# Patient Record
Sex: MALE | Race: WHITE | Employment: OTHER | URBAN - METROPOLITAN AREA
[De-identification: names, ages, dates, MRNs, and addresses within clinical notes are randomized per-mention and may not be internally consistent; named-entity substitution may affect disease eponyms.]

---

## 2017-02-25 ENCOUNTER — HOSPITAL ENCOUNTER (INPATIENT)
Age: 57
LOS: 1 days | Discharge: HOME | DRG: 045 | End: 2017-02-26
Attending: EMERGENCY MEDICINE | Admitting: INTERNAL MEDICINE
Payer: MEDICARE

## 2017-02-25 ENCOUNTER — APPOINTMENT (OUTPATIENT)
Dept: CT IMAGING | Age: 57
DRG: 045 | End: 2017-02-25
Payer: MEDICARE

## 2017-02-25 DIAGNOSIS — R20.0 LEFT ARM NUMBNESS: ICD-10-CM

## 2017-02-25 DIAGNOSIS — I63.9 CEREBROVASCULAR ACCIDENT (CVA), UNSPECIFIED MECHANISM (HCC): Primary | ICD-10-CM

## 2017-02-25 LAB — GLUCOSE BLD-MCNC: 85 MG/DL (ref 75–110)

## 2017-02-25 PROCEDURE — 2060000000 HC ICU INTERMEDIATE R&B

## 2017-02-25 PROCEDURE — 70496 CT ANGIOGRAPHY HEAD: CPT

## 2017-02-25 PROCEDURE — 84484 ASSAY OF TROPONIN QUANT: CPT

## 2017-02-25 PROCEDURE — 82947 ASSAY GLUCOSE BLOOD QUANT: CPT

## 2017-02-25 PROCEDURE — G0378 HOSPITAL OBSERVATION PER HR: HCPCS

## 2017-02-25 PROCEDURE — 70498 CT ANGIOGRAPHY NECK: CPT

## 2017-02-25 PROCEDURE — 81001 URINALYSIS AUTO W/SCOPE: CPT

## 2017-02-25 PROCEDURE — 2580000003 HC RX 258: Performed by: STUDENT IN AN ORGANIZED HEALTH CARE EDUCATION/TRAINING PROGRAM

## 2017-02-25 PROCEDURE — 6360000004 HC RX CONTRAST MEDICATION: Performed by: EMERGENCY MEDICINE

## 2017-02-25 PROCEDURE — 99285 EMERGENCY DEPT VISIT HI MDM: CPT

## 2017-02-25 PROCEDURE — 99254 IP/OBS CNSLTJ NEW/EST MOD 60: CPT | Performed by: PSYCHIATRY & NEUROLOGY

## 2017-02-25 RX ORDER — FOLIC ACID 1 MG/1
1 TABLET ORAL DAILY
Status: DISCONTINUED | OUTPATIENT
Start: 2017-02-26 | End: 2017-02-26 | Stop reason: HOSPADM

## 2017-02-25 RX ORDER — CLOPIDOGREL BISULFATE 75 MG/1
300 TABLET ORAL ONCE
Status: COMPLETED | OUTPATIENT
Start: 2017-02-25 | End: 2017-02-26

## 2017-02-25 RX ORDER — SODIUM CHLORIDE 0.9 % (FLUSH) 0.9 %
10 SYRINGE (ML) INJECTION PRN
Status: DISCONTINUED | OUTPATIENT
Start: 2017-02-25 | End: 2017-02-26 | Stop reason: HOSPADM

## 2017-02-25 RX ORDER — ALBUTEROL SULFATE 2.5 MG/3ML
2.5 SOLUTION RESPIRATORY (INHALATION) EVERY 6 HOURS PRN
COMMUNITY

## 2017-02-25 RX ORDER — ONDANSETRON 2 MG/ML
4 INJECTION INTRAMUSCULAR; INTRAVENOUS EVERY 6 HOURS PRN
Status: DISCONTINUED | OUTPATIENT
Start: 2017-02-25 | End: 2017-02-25 | Stop reason: SDUPTHER

## 2017-02-25 RX ORDER — SODIUM CHLORIDE 0.9 % (FLUSH) 0.9 %
10 SYRINGE (ML) INJECTION EVERY 12 HOURS SCHEDULED
Status: DISCONTINUED | OUTPATIENT
Start: 2017-02-25 | End: 2017-02-26

## 2017-02-25 RX ORDER — OXYCODONE HYDROCHLORIDE AND ACETAMINOPHEN 5; 325 MG/1; MG/1
1 TABLET ORAL EVERY 4 HOURS PRN
Status: ON HOLD | COMMUNITY
End: 2017-02-26 | Stop reason: ALTCHOICE

## 2017-02-25 RX ORDER — SODIUM CHLORIDE 0.9 % (FLUSH) 0.9 %
10 SYRINGE (ML) INJECTION PRN
Status: DISCONTINUED | OUTPATIENT
Start: 2017-02-25 | End: 2017-02-26

## 2017-02-25 RX ORDER — CLOPIDOGREL BISULFATE 75 MG/1
75 TABLET ORAL DAILY
Status: DISCONTINUED | OUTPATIENT
Start: 2017-02-26 | End: 2017-02-26 | Stop reason: HOSPADM

## 2017-02-25 RX ORDER — ONDANSETRON 2 MG/ML
4 INJECTION INTRAMUSCULAR; INTRAVENOUS EVERY 6 HOURS PRN
Status: DISCONTINUED | OUTPATIENT
Start: 2017-02-25 | End: 2017-02-26 | Stop reason: HOSPADM

## 2017-02-25 RX ORDER — ASPIRIN 81 MG/1
81 TABLET ORAL DAILY
Status: DISCONTINUED | OUTPATIENT
Start: 2017-02-26 | End: 2017-02-25

## 2017-02-25 RX ORDER — SIMVASTATIN 40 MG
40 TABLET ORAL NIGHTLY
Status: DISCONTINUED | OUTPATIENT
Start: 2017-02-25 | End: 2017-02-25

## 2017-02-25 RX ORDER — PANTOPRAZOLE SODIUM 20 MG/1
20 TABLET, DELAYED RELEASE ORAL DAILY
Status: ON HOLD | COMMUNITY
End: 2017-02-26 | Stop reason: HOSPADM

## 2017-02-25 RX ORDER — ACETAMINOPHEN 325 MG/1
650 TABLET ORAL EVERY 4 HOURS PRN
Status: DISCONTINUED | OUTPATIENT
Start: 2017-02-25 | End: 2017-02-26 | Stop reason: HOSPADM

## 2017-02-25 RX ORDER — SODIUM CHLORIDE 9 MG/ML
INJECTION, SOLUTION INTRAVENOUS CONTINUOUS
Status: DISCONTINUED | OUTPATIENT
Start: 2017-02-25 | End: 2017-02-26 | Stop reason: HOSPADM

## 2017-02-25 RX ORDER — ASPIRIN 81 MG/1
81 TABLET ORAL DAILY
Status: DISCONTINUED | OUTPATIENT
Start: 2017-02-26 | End: 2017-02-26 | Stop reason: HOSPADM

## 2017-02-25 RX ORDER — ATORVASTATIN CALCIUM 40 MG/1
40 TABLET, FILM COATED ORAL NIGHTLY
Status: DISCONTINUED | OUTPATIENT
Start: 2017-02-25 | End: 2017-02-26 | Stop reason: HOSPADM

## 2017-02-25 RX ORDER — PANTOPRAZOLE SODIUM 20 MG/1
20 TABLET, DELAYED RELEASE ORAL DAILY
Status: DISCONTINUED | OUTPATIENT
Start: 2017-02-26 | End: 2017-02-26 | Stop reason: HOSPADM

## 2017-02-25 RX ORDER — ALBUTEROL SULFATE 2.5 MG/3ML
2.5 SOLUTION RESPIRATORY (INHALATION) EVERY 6 HOURS PRN
Status: DISCONTINUED | OUTPATIENT
Start: 2017-02-25 | End: 2017-02-26 | Stop reason: HOSPADM

## 2017-02-25 RX ORDER — FOLIC ACID 1 MG/1
1 TABLET ORAL DAILY
COMMUNITY
End: 2018-02-06 | Stop reason: SDUPTHER

## 2017-02-25 RX ORDER — CLOPIDOGREL 300 MG/1
300 TABLET, FILM COATED ORAL ONCE
COMMUNITY
End: 2018-02-06 | Stop reason: ALTCHOICE

## 2017-02-25 RX ORDER — MELOXICAM 15 MG/1
15 TABLET ORAL DAILY
Status: ON HOLD | COMMUNITY
End: 2017-02-26 | Stop reason: HOSPADM

## 2017-02-25 RX ORDER — CARVEDILOL 6.25 MG/1
6.25 TABLET ORAL 2 TIMES DAILY WITH MEALS
COMMUNITY
End: 2018-02-06 | Stop reason: ALTCHOICE

## 2017-02-25 RX ORDER — ACETAMINOPHEN 325 MG/1
650 TABLET ORAL EVERY 4 HOURS PRN
Status: DISCONTINUED | OUTPATIENT
Start: 2017-02-25 | End: 2017-02-25 | Stop reason: SDUPTHER

## 2017-02-25 RX ADMIN — SODIUM CHLORIDE: 9 INJECTION, SOLUTION INTRAVENOUS at 23:53

## 2017-02-25 RX ADMIN — IOHEXOL 90 ML: 350 INJECTION, SOLUTION INTRAVENOUS at 22:44

## 2017-02-25 ASSESSMENT — PAIN DESCRIPTION - LOCATION: LOCATION: ARM

## 2017-02-25 ASSESSMENT — ENCOUNTER SYMPTOMS
COLOR CHANGE: 0
DIARRHEA: 0
COUGH: 0
TROUBLE SWALLOWING: 0
SHORTNESS OF BREATH: 0
NAUSEA: 0
ABDOMINAL PAIN: 0
SORE THROAT: 0
VOMITING: 0

## 2017-02-25 ASSESSMENT — PAIN SCALES - GENERAL
PAINLEVEL_OUTOF10: 0
PAINLEVEL_OUTOF10: 7

## 2017-02-25 ASSESSMENT — PAIN DESCRIPTION - ORIENTATION: ORIENTATION: LEFT

## 2017-02-25 ASSESSMENT — PAIN DESCRIPTION - DESCRIPTORS: DESCRIPTORS: NUMBNESS

## 2017-02-26 ENCOUNTER — APPOINTMENT (OUTPATIENT)
Dept: MRI IMAGING | Age: 57
DRG: 045 | End: 2017-02-26
Payer: MEDICARE

## 2017-02-26 VITALS
TEMPERATURE: 97.3 F | HEIGHT: 72 IN | OXYGEN SATURATION: 98 % | HEART RATE: 67 BPM | WEIGHT: 165 LBS | RESPIRATION RATE: 18 BRPM | BODY MASS INDEX: 22.35 KG/M2 | SYSTOLIC BLOOD PRESSURE: 132 MMHG | DIASTOLIC BLOOD PRESSURE: 76 MMHG

## 2017-02-26 PROBLEM — J44.9 COPD (CHRONIC OBSTRUCTIVE PULMONARY DISEASE) (HCC): Status: ACTIVE | Noted: 2017-02-26

## 2017-02-26 PROBLEM — I25.810 CAD (CORONARY ARTERY DISEASE) OF ARTERY BYPASS GRAFT: Status: ACTIVE | Noted: 2017-02-26

## 2017-02-26 PROBLEM — I63.9 CVA (CEREBRAL VASCULAR ACCIDENT) (HCC): Status: ACTIVE | Noted: 2017-02-26

## 2017-02-26 PROBLEM — E78.5 HYPERLIPIDEMIA: Status: ACTIVE | Noted: 2017-02-26

## 2017-02-26 LAB
-: ABNORMAL
ABSOLUTE EOS #: 0.1 K/UL (ref 0–0.4)
ABSOLUTE LYMPH #: 1.2 K/UL (ref 1–4.8)
ABSOLUTE MONO #: 0.8 K/UL (ref 0.1–1.2)
AMORPHOUS: ABNORMAL
ANION GAP SERPL CALCULATED.3IONS-SCNC: 13 MMOL/L (ref 9–17)
BACTERIA: ABNORMAL
BASOPHILS # BLD: 1 % (ref 0–2)
BASOPHILS ABSOLUTE: 0 K/UL (ref 0–0.2)
BILIRUBIN URINE: NEGATIVE
BUN BLDV-MCNC: 17 MG/DL (ref 6–20)
BUN/CREAT BLD: NORMAL (ref 9–20)
CALCIUM SERPL-MCNC: 9.1 MG/DL (ref 8.6–10.4)
CASTS UA: ABNORMAL /LPF (ref 0–8)
CHLORIDE BLD-SCNC: 102 MMOL/L (ref 98–107)
CHOLESTEROL/HDL RATIO: 3.9
CHOLESTEROL: 142 MG/DL
CO2: 23 MMOL/L (ref 20–31)
COLOR: YELLOW
CREAT SERPL-MCNC: 1.07 MG/DL (ref 0.7–1.2)
CRYSTALS, UA: ABNORMAL /HPF
DIFFERENTIAL TYPE: ABNORMAL
EOSINOPHILS RELATIVE PERCENT: 2 % (ref 1–4)
EPITHELIAL CELLS UA: ABNORMAL /HPF (ref 0–5)
ESTIMATED AVERAGE GLUCOSE: 111 MG/DL
FOLATE: >20 NG/ML
GFR AFRICAN AMERICAN: >60 ML/MIN
GFR NON-AFRICAN AMERICAN: >60 ML/MIN
GFR SERPL CREATININE-BSD FRML MDRD: NORMAL ML/MIN/{1.73_M2}
GFR SERPL CREATININE-BSD FRML MDRD: NORMAL ML/MIN/{1.73_M2}
GLUCOSE BLD-MCNC: 95 MG/DL (ref 70–99)
GLUCOSE URINE: NEGATIVE
HBA1C MFR BLD: 5.5 % (ref 4–6)
HCT VFR BLD CALC: 35.5 % (ref 41–53)
HDLC SERPL-MCNC: 36 MG/DL
HEMOGLOBIN: 11.9 G/DL (ref 13.5–17.5)
KETONES, URINE: NEGATIVE
LDL CHOLESTEROL: 81 MG/DL (ref 0–130)
LEUKOCYTE ESTERASE, URINE: NEGATIVE
LYMPHOCYTES # BLD: 18 % (ref 24–44)
MCH RBC QN AUTO: 29.2 PG (ref 26–34)
MCHC RBC AUTO-ENTMCNC: 33.7 G/DL (ref 31–37)
MCV RBC AUTO: 86.7 FL (ref 80–100)
MONOCYTES # BLD: 11 % (ref 2–11)
MUCUS: ABNORMAL
NITRITE, URINE: NEGATIVE
OTHER OBSERVATIONS UA: ABNORMAL
PDW BLD-RTO: 14.8 % (ref 12.5–15.4)
PH UA: 5 (ref 5–8)
PLATELET # BLD: 216 K/UL (ref 140–450)
PLATELET ESTIMATE: ABNORMAL
PMV BLD AUTO: 7.9 FL (ref 6–12)
POTASSIUM SERPL-SCNC: 4.2 MMOL/L (ref 3.7–5.3)
PROTEIN UA: NEGATIVE
RBC # BLD: 4.09 M/UL (ref 4.5–5.9)
RBC # BLD: ABNORMAL 10*6/UL
RBC UA: ABNORMAL /HPF (ref 0–4)
RENAL EPITHELIAL, UA: ABNORMAL /HPF
SEDIMENTATION RATE, ERYTHROCYTE: 6 MM (ref 0–10)
SEG NEUTROPHILS: 68 % (ref 36–66)
SEGMENTED NEUTROPHILS ABSOLUTE COUNT: 4.6 K/UL (ref 1.8–7.7)
SODIUM BLD-SCNC: 138 MMOL/L (ref 135–144)
SPECIFIC GRAVITY UA: 1.04 (ref 1–1.03)
TRICHOMONAS: ABNORMAL
TRIGL SERPL-MCNC: 124 MG/DL
TROPONIN INTERP: NORMAL
TROPONIN INTERP: NORMAL
TROPONIN T: <0.03 NG/ML
TROPONIN T: <0.03 NG/ML
TSH SERPL DL<=0.05 MIU/L-ACNC: 2.61 MIU/L (ref 0.3–5)
TURBIDITY: CLEAR
URINE HGB: NEGATIVE
UROBILINOGEN, URINE: NORMAL
VITAMIN B-12: 265 PG/ML (ref 211–946)
VLDLC SERPL CALC-MCNC: ABNORMAL MG/DL (ref 1–30)
WBC # BLD: 6.7 K/UL (ref 3.5–11)
WBC # BLD: ABNORMAL 10*3/UL
WBC UA: ABNORMAL /HPF (ref 0–5)
YEAST: ABNORMAL

## 2017-02-26 PROCEDURE — G8987 SELF CARE CURRENT STATUS: HCPCS

## 2017-02-26 PROCEDURE — 83036 HEMOGLOBIN GLYCOSYLATED A1C: CPT

## 2017-02-26 PROCEDURE — 82607 VITAMIN B-12: CPT

## 2017-02-26 PROCEDURE — 84443 ASSAY THYROID STIM HORMONE: CPT

## 2017-02-26 PROCEDURE — 97165 OT EVAL LOW COMPLEX 30 MIN: CPT

## 2017-02-26 PROCEDURE — G8988 SELF CARE GOAL STATUS: HCPCS

## 2017-02-26 PROCEDURE — 82746 ASSAY OF FOLIC ACID SERUM: CPT

## 2017-02-26 PROCEDURE — 85651 RBC SED RATE NONAUTOMATED: CPT

## 2017-02-26 PROCEDURE — 70551 MRI BRAIN STEM W/O DYE: CPT

## 2017-02-26 PROCEDURE — 94762 N-INVAS EAR/PLS OXIMTRY CONT: CPT

## 2017-02-26 PROCEDURE — 36415 COLL VENOUS BLD VENIPUNCTURE: CPT

## 2017-02-26 PROCEDURE — 97161 PT EVAL LOW COMPLEX 20 MIN: CPT

## 2017-02-26 PROCEDURE — 99223 1ST HOSP IP/OBS HIGH 75: CPT | Performed by: INTERNAL MEDICINE

## 2017-02-26 PROCEDURE — 84484 ASSAY OF TROPONIN QUANT: CPT

## 2017-02-26 PROCEDURE — 97535 SELF CARE MNGMENT TRAINING: CPT

## 2017-02-26 PROCEDURE — 85025 COMPLETE CBC W/AUTO DIFF WBC: CPT

## 2017-02-26 PROCEDURE — G0378 HOSPITAL OBSERVATION PER HR: HCPCS

## 2017-02-26 PROCEDURE — 80048 BASIC METABOLIC PNL TOTAL CA: CPT

## 2017-02-26 PROCEDURE — 6360000002 HC RX W HCPCS: Performed by: STUDENT IN AN ORGANIZED HEALTH CARE EDUCATION/TRAINING PROGRAM

## 2017-02-26 PROCEDURE — 6370000000 HC RX 637 (ALT 250 FOR IP): Performed by: EMERGENCY MEDICINE

## 2017-02-26 PROCEDURE — 6370000000 HC RX 637 (ALT 250 FOR IP): Performed by: STUDENT IN AN ORGANIZED HEALTH CARE EDUCATION/TRAINING PROGRAM

## 2017-02-26 PROCEDURE — 80061 LIPID PANEL: CPT

## 2017-02-26 PROCEDURE — 70551 MRI BRAIN STEM W/O DYE: CPT | Performed by: RADIOLOGY

## 2017-02-26 PROCEDURE — 99254 IP/OBS CNSLTJ NEW/EST MOD 60: CPT

## 2017-02-26 RX ORDER — NICOTINE POLACRILEX 4 MG/1
20 GUM, CHEWING ORAL DAILY
COMMUNITY
End: 2018-02-06 | Stop reason: SDUPTHER

## 2017-02-26 RX ORDER — NITROGLYCERIN 0.4 MG/1
0.4 TABLET SUBLINGUAL EVERY 5 MIN PRN
COMMUNITY

## 2017-02-26 RX ORDER — ATORVASTATIN CALCIUM 40 MG/1
40 TABLET, FILM COATED ORAL NIGHTLY
Qty: 30 TABLET | Refills: 3 | Status: SHIPPED | OUTPATIENT
Start: 2017-02-26 | End: 2018-02-06 | Stop reason: SDUPTHER

## 2017-02-26 RX ADMIN — FOLIC ACID 1 MG: 1 TABLET ORAL at 08:45

## 2017-02-26 RX ADMIN — PANTOPRAZOLE SODIUM 20 MG: 20 TABLET, DELAYED RELEASE ORAL at 08:45

## 2017-02-26 RX ADMIN — CLOPIDOGREL 300 MG: 75 TABLET, FILM COATED ORAL at 00:56

## 2017-02-26 RX ADMIN — ASPIRIN 81 MG: 81 TABLET, COATED ORAL at 08:45

## 2017-02-26 RX ADMIN — CLOPIDOGREL 75 MG: 75 TABLET, FILM COATED ORAL at 08:45

## 2017-02-26 RX ADMIN — ENOXAPARIN SODIUM 40 MG: 40 INJECTION SUBCUTANEOUS at 08:45

## 2017-02-26 RX ADMIN — ATORVASTATIN CALCIUM 40 MG: 40 TABLET, FILM COATED ORAL at 00:55

## 2018-02-06 PROBLEM — I10 HYPERTENSION: Status: ACTIVE | Noted: 2018-02-06

## 2018-02-21 ENCOUNTER — HOSPITAL ENCOUNTER (OUTPATIENT)
Dept: PAIN MANAGEMENT | Age: 58
Discharge: HOME | End: 2018-02-21
Payer: MEDICARE

## 2018-02-21 VITALS
BODY MASS INDEX: 27.92 KG/M2 | TEMPERATURE: 98.2 F | DIASTOLIC BLOOD PRESSURE: 75 MMHG | HEIGHT: 70 IN | WEIGHT: 195 LBS | OXYGEN SATURATION: 100 % | RESPIRATION RATE: 18 BRPM | SYSTOLIC BLOOD PRESSURE: 149 MMHG | HEART RATE: 65 BPM

## 2018-02-21 DIAGNOSIS — G89.29 CHRONIC BILATERAL LOW BACK PAIN, WITH SCIATICA PRESENCE UNSPECIFIED: ICD-10-CM

## 2018-02-21 DIAGNOSIS — J44.9 CHRONIC OBSTRUCTIVE PULMONARY DISEASE, UNSPECIFIED COPD TYPE (HCC): ICD-10-CM

## 2018-02-21 DIAGNOSIS — G62.1 ALCOHOL-INDUCED POLYNEUROPATHY (HCC): ICD-10-CM

## 2018-02-21 DIAGNOSIS — I25.810 CORONARY ARTERY DISEASE INVOLVING CORONARY BYPASS GRAFT OF NATIVE HEART WITHOUT ANGINA PECTORIS: ICD-10-CM

## 2018-02-21 DIAGNOSIS — I63.9 CEREBROVASCULAR ACCIDENT (CVA), UNSPECIFIED MECHANISM (HCC): Primary | ICD-10-CM

## 2018-02-21 DIAGNOSIS — I73.9 PERIPHERAL VASCULAR DISEASE (HCC): ICD-10-CM

## 2018-02-21 DIAGNOSIS — M54.5 CHRONIC BILATERAL LOW BACK PAIN, WITH SCIATICA PRESENCE UNSPECIFIED: ICD-10-CM

## 2018-02-21 PROCEDURE — 80307 DRUG TEST PRSMV CHEM ANLYZR: CPT

## 2018-02-21 PROCEDURE — 99205 OFFICE O/P NEW HI 60 MIN: CPT

## 2018-02-21 PROCEDURE — 99244 OFF/OP CNSLTJ NEW/EST MOD 40: CPT | Performed by: PAIN MEDICINE

## 2018-02-21 ASSESSMENT — ENCOUNTER SYMPTOMS
SHORTNESS OF BREATH: 1
HEARTBURN: 1
SORE THROAT: 0
VOMITING: 0
NAUSEA: 0
SINUS PAIN: 0
BLURRED VISION: 0
BACK PAIN: 1
COUGH: 1
EYES NEGATIVE: 1

## 2018-02-21 ASSESSMENT — PAIN SCALES - GENERAL: PAINLEVEL_OUTOF10: 10

## 2018-02-21 ASSESSMENT — PAIN DESCRIPTION - ORIENTATION: ORIENTATION: RIGHT;LEFT

## 2018-02-21 ASSESSMENT — PAIN DESCRIPTION - ONSET: ONSET: ON-GOING

## 2018-02-21 ASSESSMENT — PAIN DESCRIPTION - LOCATION: LOCATION: BACK;LEG;SHOULDER;NECK

## 2018-02-21 ASSESSMENT — PAIN DESCRIPTION - FREQUENCY: FREQUENCY: CONTINUOUS

## 2018-02-21 ASSESSMENT — PAIN DESCRIPTION - PROGRESSION: CLINICAL_PROGRESSION: GRADUALLY WORSENING

## 2018-02-21 ASSESSMENT — PAIN DESCRIPTION - PAIN TYPE: TYPE: CHRONIC PAIN

## 2018-02-21 NOTE — PROGRESS NOTES
[facility] Pain Management  Patient Pain Assessment  RECHECK - No att. providers found    Primary Care Physician: Nicolás Prather MD    Chief complaint:   Chief Complaint   Patient presents with    Back Pain    Neck Pain   . HISTORY OF PRESENT ILLNESS:  Zaida Salgado is 56 y.o. male  Referred to the pain clinic in consultation for back pain and neck pain and leg pain by Dr. Nicolás Prather MD        Pain excruciating -cannt walk o-cramopas at [facility]--burnibng tingly-  consst---10   patient is a 79-year-old male who is referred to the pain clinic today with a chief complaint of pain involving the low back from waist down to the legs. The pain is burning with associated numbness tingling and weakness. He reports he develops Cramps in his feet bilaterally. He uses a walker to help with ambulation. Patient also has pain in the neck with radiation of the pain. The shoulders and arms bilaterally with associated tingling numbness and weakness. Patient reports that he has been to several physicians in the past. He was also seen by  A pain physician in [place] and was given tramadol. It was not controlling the pain and hence he is referred to this pain clinic. Patient's medical history is also significant for alcoholism as well as a smoking. Patient had an MI and had a triple bypass surgery in 2016 she also has history of CVA. He has a right femoral bypass surgery done in the past with the history of peripheral vascular disease. He was seen by neurologist at [facility] clinic and was diagnosed with the peripheral neuropathy of uncertain etiology. Patient reports his pain is constant in the lower extremities. It is burning in nature he rates his pain is constant 10 on a scale of 0-10. It is somewhat difficult to get a good history from him. the following is the notes  copied from his neurological evaluation by Trace Link MD  Neurology    Mr. Anastasiia Page is referred by Angel Sharif.  [facility] treatment - on lamotrigine, has tried gabapentin and pregabalin  4. Alcohol cessation  5. Physical therapy - defers at this time as it exacerbated the neck pain  6. Follow-as needed    Of the 60 minute long appointment visit, spent at least 50% of the face-to-face time in counseling, explanation of diagnosis, and planning of further management    Constance Rodríguez MD  Neurology              OARRS compliant? not applicable  Concern for prescription abuse?not applicable    Current Pain Assessment  Pain Assessment  Pain Assessment: 0-10  Pain Level: 10  Pain Type: Chronic pain  Pain Location: Back, Leg, Shoulder, Neck  Pain Orientation: Right, Left  Pain Radiating Towards: bilat. legs, bilat. shoulders, arms  Pain Descriptors: Constant, Aching, Numbness, Tingling, Sharp, Shooting, Radiating (shoulder arms numbness tingling, sharp, popping, back nunbness ltingling, sharp)  Pain Frequency: Continuous  Pain Onset: On-going  Clinical Progression: Gradually worsening  Effect of Pain on Daily Activities: neck difficult turning or lifting items or using hands, back/legs can't walk more than 1/2 block w/o stopping frequent walks w/walker  Patient's Stated Pain Goal: 3 (To be able to do daily activiies /walk/turn head w/less pain)  Pain Intervention(s): Medication (see eMar), Rest, Repositioned, Elevation (has a hospital bed helps)                    ADVERSE MEDICATION EFFECTS:   Constipation: no  Bowel Regimen: Yes  Diet: common adult  Appetite:  ok  Sedation:  not applicable  Urinary Retention: no    FOCUSED PAIN SCALE:  Highest : 10  Lowest :10  Average: Range-10  When and What  was your last procedure:    Neck/back Dr. Cortez Lefort Pain  Was your procedure effective:  no    ACTIVITY/SOCIAL/EMOTIONAL:  Sleep Pattern: 5 hours per night.  difficulty falling asleep, nightime awakenings and difficulty falling back asleep if awakened  Energy Level:  Tired/Fatigued  Currently attending Physical Therapy:  No  Home Exercises: never lungs 2 times daily      fluticasone (FLONASE) 50 MCG/ACT nasal spray 1 spray by Nasal route daily      lidocaine (XYLOCAINE) 5 % ointment Apply topically daily Apply topically as needed.  tamsulosin (FLOMAX) 0.4 MG capsule Take 0.4 mg by mouth daily      Plecanatide (TRULANCE) 3 MG TABS Take by mouth daily      alfuzosin (UROXATRAL) 10 MG extended release tablet Take 10 mg by mouth      cyanocobalamin 1000 MCG/ML injection Inject 1,000 mcg into the muscle once      aspirin 81 MG tablet Take 1 tablet by mouth daily 30 tablet 0    atorvastatin (LIPITOR) 40 MG tablet Take 1 tablet by mouth daily 30 tablet 1    carvedilol (COREG) 25 MG tablet Take 1 tablet by mouth 2 times daily 60 tablet 0    folic acid (FOLVITE) 1 MG tablet Take 1 tablet by mouth daily 30 tablet 0    omeprazole 20 MG EC tablet Take 1 tablet by mouth daily 30 tablet 0    pantoprazole (PROTONIX) 20 MG tablet Take 2 tablets by mouth daily 30 tablet 0    nitroGLYCERIN (NITROSTAT) 0.4 MG SL tablet Place 0.4 mg under the tongue every 5 minutes as needed      albuterol (PROVENTIL) (2.5 MG/3ML) 0.083% nebulizer solution Take 2.5 mg by nebulization every 6 hours as needed for Wheezing      Plecanatide (TRULANCE) 3 MG TABS        No current facility-administered medications for this encounter. Allergies  Review of patient's allergies indicates no known allergies. Family History  family history includes Cancer in his father; Heart Disease in his mother; Kidney Disease in his mother.     Social History  Social History     Social History    Marital status: Single     Spouse name: N/A    Number of children: N/A    Years of education: N/A     Occupational History    disability      Social History Main Topics    Smoking status: Former Smoker     Years: 15.00     Types: Cigarettes    Smokeless tobacco: Never Used    Alcohol use 2.4 oz/week     4 Cans of beer per week      Comment: daily    Drug use: Yes     Types: Marijuana Comment: last marijuana Sunday 2/18,2018    Sexual activity: Yes     Partners: Female     Other Topics Concern    None     Social History Narrative    None      reports that he uses drugs, including Marijuana. REVIEW OF SYSTEMS:  Review of Systems   Constitutional: Negative. Negative for fever, malaise/fatigue and weight loss. HENT: Negative. Negative for ear discharge, hearing loss, sinus pain and sore throat. Eyes: Negative. Negative for blurred vision and photophobia. Respiratory: Positive for cough (copd) and shortness of breath. Cardiovascular: Positive for chest pain (swelling in chest having test done this week, cardiologist knows) and leg swelling. Negative for palpitations. Gastrointestinal: Positive for heartburn. Negative for constipation, nausea and vomiting. Genitourinary: Negative. Negative for dysuria. Musculoskeletal: Positive for back pain, joint pain (shoulders, knees), myalgias and neck pain. Negative for falls. Skin: Negative. Negative for rash. Neurological: Positive for dizziness, tingling and focal weakness. Negative for tremors and seizures. Endo/Heme/Allergies: Bruises/bleeds easily (plavix on). Psychiatric/Behavioral: Positive for depression. Negative for suicidal ideas. The patient is nervous/anxious and has insomnia. GENERAL PHYSICAL EXAM:  Vitals: BP (!) 149/75   Pulse 65   Temp 98.2 °F (36.8 °C) (Oral)   Resp 18   Ht 5' 10\" (1.778 m)   Wt 195 lb (88.5 kg)   SpO2 100%   BMI 27.98 kg/m² , Body mass index is 27.98 kg/m². Physical Exam   Constitutional: He is oriented to person, place, and time. He appears well-developed and well-nourished. HENT:   Head: Normocephalic and atraumatic. Eyes: Conjunctivae and EOM are normal. Pupils are equal, round, and reactive to light. Neck: Neck supple. No tracheal deviation present. No thyromegaly present. Cardiovascular: Normal rate and regular rhythm.     Pulmonary/Chest: Effort normal Chloride 102  98 - 107 mmol/L Final 02/26/2017  6:15 AM [facility]   CO2 23  20 - 31 mmol/L Final 02/26/2017  6:15 AM [facility]   Anion Gap 13  9 - 17 mmol/L Final 02/26/2017  6:15 AM [facility]   GFR Non-African American >60  >60 mL/min Final 02/26/2017  6:15 AM [facility]   GFR African American >60  >60 mL/min Final 02/26/2017  6:15 AM [facility]   GFR Comment        Final 02/26/2017  6:15 AM [facility]         Imaging:  Radiology Images and Reports reviewed where indicated and necessary  CTA OF THE NECK 2/25/2017 10:58 pm       TECHNIQUE:   CTA of the neck was performed with the administration of intravenous   contrast. Multiplanar reformatted images are provided for review.  MIP images   are provided for review. Stenosis of the internal carotid arteries measured   using NASCET criteria. Dose modulation, iterative reconstruction, and/or   weight based adjustment of the mA/kV was utilized to reduce the radiation   dose to as low as reasonably achievable.       COMPARISON:   None.       HISTORY:   Initial encounter.  Acute illness.  Left-sided paresthesia.       FINDINGS:   AORTIC ARCH/GREAT VESSELS: Mild atherosclerotic plaque at the aortic arch   without evident aneurysm or dissection.  Conventional 3 vessel arch anatomy.    Mild atherosclerotic plaque involving the proximal subclavian arteries   bilaterally without hemodynamically significant stenosis.       CAROTID ARTERIES: Mild stenosis at the origin of the right common carotid   artery secondary to atherosclerotic plaque.  Moderate noncalcified plaque   causing 50% stenosis of the right internal carotid artery origin.  Right   internal carotid artery otherwise normal in course and caliber.  Right   external carotid artery patent.  Mild atherosclerotic plaque at the origin of   the left common carotid artery without stenosis.  Mild stenosis of mid left   common carotid artery secondary to eccentric plaque.  Moderate fibrocalcific   plaque at the left carotid bifurcation receiving prescriptions from multiple prescribers. Patient is  forthcoming regarding prescriptions for pain medication in the past  Controlled Substances Monitoring: Attestation: The Prescription Monitoring Report for this patient was reviewed today. (Petra Barreto MD)  Documentation: Random urine drug screen sent today. (Petra Barreto MD)    The following screens were also reviewed  SOAPP- the score is 3. (Values:cates patient is  <4minimal potential  4-7 Moderate potential  >7 High potential  for drug addiction  Counselling/Preventive measures for pain  Control:    [x]  Spine strengthening exercises are discussed with patient in detail. Orders Placed This Encounter   Procedures    DRUG SCREEN, PAIN     Standing Status:   Standing     Number of Occurrences:   1    PT eval and treat     TBD by Pain Clinic MD     Standing Status:   Future     Standing Expiration Date:   2/21/2019    PT aquatic therapy     TBD by Pain Clinic MD     Standing Status:   Future     Standing Expiration Date:   2/21/2019      patient is extensively counseled on importance of follow stopping alcohol. The risks of drinking were explained to the patient including damage to the peripheral nerves as well as to the liver. at this time are like to obtain medical records from his previous pain clinic physicians. We'll also do a urine screen on him today. There are no drug compliance problems then we may be able to accept him. Patient was told the decision will depend on the review of his records and the urine screen report. Patient understands. We'll contact him after we have all the reports and make a decision. Decision Making Process : Patient's health history and referral records thoroughly reviewed before focused physical examination and discussion with patient. Over 50% of today's visit is spent on examining the patient and counseling. Level of complexity of date to be reviewed is Moderate.  The chart date reviewed include the following: Imaging Reports. Summary of Care. Time spent reviewing with patient the below reports:   Medication safety, Treatment options. Level of diagnosis and management options of this case is multiple: involving the following management options: Interventions as needed, medication management as appropriate, future visits, activity modification, heat/ice as needed, Urine drug screen as required. [x]The patient's questions were answered to the best of my abilities. This note was created using voice recognition software. There may be inaccuracies of transcription  that are inadvertently overlooked prior to the signature. There is any questions about the transcription please contact me.     Electronically signed by Simi Castellano MD on 2/22/2018 at 5:50 AM

## 2018-02-22 ASSESSMENT — ENCOUNTER SYMPTOMS
CONSTIPATION: 0
PHOTOPHOBIA: 0

## 2018-02-26 LAB
6-ACETYLMORPHINE, UR: NOT DETECTED
7-AMINOCLONAZEPAM, URINE: NOT DETECTED
ALPHA-OH-ALPRAZ, URINE: NOT DETECTED
ALPRAZOLAM, URINE: NOT DETECTED
AMPHETAMINES, URINE: NOT DETECTED
BARBITURATES, URINE: NOT DETECTED
BENZOYLECGONINE, UR: NOT DETECTED
BUPRENORPHINE URINE: NOT DETECTED
CARISOPRODOL, UR: NOT DETECTED
CLONAZEPAM, URINE: NOT DETECTED
CODEINE, URINE: NOT DETECTED
CREATININE URINE: 33.2 MG/DL (ref 20–400)
DIAZEPAM, URINE: NOT DETECTED
DRUGS EXPECTED, UR: NORMAL
EER HI RES INTERP UR: NORMAL
ETHYL GLUCURONIDE UR: PRESENT
FENTANYL URINE: NOT DETECTED
HYDROCODONE, URINE: NOT DETECTED
HYDROMORPHONE, URINE: NOT DETECTED
LORAZEPAM, URINE: NOT DETECTED
MARIJUANA METAB, UR: PRESENT
MDA, UR: NOT DETECTED
MDEA, EVE, UR: NOT DETECTED
MDMA URINE: NOT DETECTED
MEPERIDINE METAB, UR: NOT DETECTED
METHADONE, URINE: NOT DETECTED
METHAMPHETAMINE, URINE: NOT DETECTED
METHYLPHENIDATE: NOT DETECTED
MIDAZOLAM, URINE: NOT DETECTED
MORPHINE URINE: NOT DETECTED
NORBUPRENORPHINE, URINE: NOT DETECTED
NORDIAZEPAM, URINE: NOT DETECTED
NORFENTANYL, URINE: NOT DETECTED
NORHYDROCODONE, URINE: NOT DETECTED
NOROXYCODONE, URINE: NOT DETECTED
NOROXYMORPHONE, URINE: NOT DETECTED
OXAZEPAM, URINE: NOT DETECTED
OXYCODONE URINE: NOT DETECTED
OXYMORPHONE, URINE: NOT DETECTED
PAIN MANAGEMENT DRUG PANEL INTERP, URINE: NORMAL
PAIN MGT DRUG PANEL, HI RES, UR: NORMAL
PCP,URINE: NOT DETECTED
PHENTERMINE, UR: NOT DETECTED
PROPOXYPHENE, URINE: NOT DETECTED
TAPENTADOL, URINE: NOT DETECTED
TAPENTADOL-O-SULFATE, URINE: NOT DETECTED
TEMAZEPAM, URINE: NOT DETECTED
TRAMADOL, URINE: NOT DETECTED
ZOLPIDEM, URINE: NOT DETECTED

## 2018-03-02 ENCOUNTER — TELEPHONE (OUTPATIENT)
Dept: PAIN MANAGEMENT | Age: 58
End: 2018-03-02

## 2018-04-05 ENCOUNTER — OFFICE VISIT (OUTPATIENT)
Dept: NEUROLOGY | Age: 58
End: 2018-04-05
Payer: MEDICARE

## 2018-04-05 VITALS
HEART RATE: 99 BPM | WEIGHT: 195.2 LBS | DIASTOLIC BLOOD PRESSURE: 82 MMHG | SYSTOLIC BLOOD PRESSURE: 130 MMHG | BODY MASS INDEX: 27.94 KG/M2 | HEIGHT: 70 IN

## 2018-04-05 DIAGNOSIS — R20.2 NUMBNESS AND TINGLING: Primary | ICD-10-CM

## 2018-04-05 DIAGNOSIS — I63.00 CEREBROVASCULAR ACCIDENT (CVA) DUE TO THROMBOSIS OF PRECEREBRAL ARTERY (HCC): ICD-10-CM

## 2018-04-05 DIAGNOSIS — R20.0 NUMBNESS AND TINGLING: Primary | ICD-10-CM

## 2018-04-05 PROCEDURE — 1036F TOBACCO NON-USER: CPT | Performed by: PSYCHIATRY & NEUROLOGY

## 2018-04-05 PROCEDURE — G8419 CALC BMI OUT NRM PARAM NOF/U: HCPCS | Performed by: PSYCHIATRY & NEUROLOGY

## 2018-04-05 PROCEDURE — G8598 ASA/ANTIPLAT THER USED: HCPCS | Performed by: PSYCHIATRY & NEUROLOGY

## 2018-04-05 PROCEDURE — 99214 OFFICE O/P EST MOD 30 MIN: CPT | Performed by: PSYCHIATRY & NEUROLOGY

## 2018-04-05 PROCEDURE — 3017F COLORECTAL CA SCREEN DOC REV: CPT | Performed by: PSYCHIATRY & NEUROLOGY

## 2018-04-05 PROCEDURE — G8427 DOCREV CUR MEDS BY ELIG CLIN: HCPCS | Performed by: PSYCHIATRY & NEUROLOGY

## 2018-04-05 RX ORDER — DULOXETIN HYDROCHLORIDE 30 MG/1
30 CAPSULE, DELAYED RELEASE ORAL DAILY
Qty: 60 CAPSULE | Refills: 3 | Status: SHIPPED | OUTPATIENT
Start: 2018-04-05 | End: 2018-06-12 | Stop reason: SDUPTHER

## 2018-04-13 ENCOUNTER — TELEPHONE (OUTPATIENT)
Dept: NEUROLOGY | Age: 58
End: 2018-04-13

## 2018-04-17 ENCOUNTER — TELEPHONE (OUTPATIENT)
Dept: NEUROLOGY | Age: 58
End: 2018-04-17

## 2018-05-03 RX ORDER — TIZANIDINE 4 MG/1
TABLET ORAL
Qty: 30 TABLET | Refills: 0 | Status: SHIPPED | OUTPATIENT
Start: 2018-05-03 | End: 2018-06-02

## 2018-06-12 ENCOUNTER — OFFICE VISIT (OUTPATIENT)
Dept: NEUROLOGY | Age: 58
End: 2018-06-12
Payer: MEDICARE

## 2018-06-12 VITALS
WEIGHT: 183 LBS | DIASTOLIC BLOOD PRESSURE: 61 MMHG | HEART RATE: 77 BPM | SYSTOLIC BLOOD PRESSURE: 96 MMHG | BODY MASS INDEX: 26.2 KG/M2 | HEIGHT: 70 IN

## 2018-06-12 DIAGNOSIS — R53.1 FUNCTIONAL WEAKNESS: ICD-10-CM

## 2018-06-12 DIAGNOSIS — I63.00 CEREBROVASCULAR ACCIDENT (CVA) DUE TO THROMBOSIS OF PRECEREBRAL ARTERY (HCC): Primary | ICD-10-CM

## 2018-06-12 DIAGNOSIS — R20.2 PARESTHESIAS: ICD-10-CM

## 2018-06-12 PROCEDURE — 99214 OFFICE O/P EST MOD 30 MIN: CPT | Performed by: PSYCHIATRY & NEUROLOGY

## 2018-06-12 PROCEDURE — G8419 CALC BMI OUT NRM PARAM NOF/U: HCPCS | Performed by: PSYCHIATRY & NEUROLOGY

## 2018-06-12 PROCEDURE — 1036F TOBACCO NON-USER: CPT | Performed by: PSYCHIATRY & NEUROLOGY

## 2018-06-12 PROCEDURE — G8427 DOCREV CUR MEDS BY ELIG CLIN: HCPCS | Performed by: PSYCHIATRY & NEUROLOGY

## 2018-06-12 PROCEDURE — G8598 ASA/ANTIPLAT THER USED: HCPCS | Performed by: PSYCHIATRY & NEUROLOGY

## 2018-06-12 PROCEDURE — 3017F COLORECTAL CA SCREEN DOC REV: CPT | Performed by: PSYCHIATRY & NEUROLOGY

## 2018-06-12 RX ORDER — TRAZODONE HYDROCHLORIDE 50 MG/1
50 TABLET ORAL NIGHTLY
COMMUNITY
Start: 2018-04-10

## 2018-06-12 RX ORDER — DULOXETIN HYDROCHLORIDE 60 MG/1
60 CAPSULE, DELAYED RELEASE ORAL DAILY
Qty: 30 CAPSULE | Refills: 3 | Status: SHIPPED | OUTPATIENT
Start: 2018-06-12

## 2018-06-12 RX ORDER — TIZANIDINE 4 MG/1
4 TABLET ORAL EVERY EVENING
Qty: 90 TABLET | Refills: 1 | Status: SHIPPED | OUTPATIENT
Start: 2018-06-12 | End: 2019-04-18 | Stop reason: ALTCHOICE

## 2018-06-12 RX ORDER — TRAMADOL HYDROCHLORIDE 50 MG/1
50 TABLET ORAL NIGHTLY PRN
COMMUNITY
Start: 2018-04-10

## 2018-06-14 ENCOUNTER — TELEPHONE (OUTPATIENT)
Dept: NEUROLOGY | Age: 58
End: 2018-06-14

## 2018-07-03 ENCOUNTER — TELEPHONE (OUTPATIENT)
Dept: NEUROLOGY | Age: 58
End: 2018-07-03

## 2018-07-03 NOTE — TELEPHONE ENCOUNTER
Lyly Samson called the office. He has decided that he wants the MRI done at [facility]. The order was faxed to them.

## 2018-07-30 ENCOUNTER — TELEPHONE (OUTPATIENT)
Dept: NEUROLOGY | Age: 58
End: 2018-07-30

## 2018-08-07 ENCOUNTER — TELEPHONE (OUTPATIENT)
Dept: NEUROLOGY | Age: 58
End: 2018-08-07

## 2018-08-14 ENCOUNTER — TELEPHONE (OUTPATIENT)
Dept: NEUROLOGY | Age: 58
End: 2018-08-14

## 2018-08-15 ENCOUNTER — TELEPHONE (OUTPATIENT)
Dept: NEUROLOGY | Age: 58
End: 2018-08-15

## 2018-08-15 NOTE — TELEPHONE ENCOUNTER
Saran with [employer] called. Elisa Paul wants to have the MRI done there. She was requesting notes, etc.  I Noted that he had previuosly been going to go to [facility] and we had noted to [employer] that location. I called [employer] and they changed the PA to [facility] and the Auth. # is [id number] good from 7/16-8/31/18. I called Lucinda Leos at [facility] [phone],  with this information.

## 2018-08-28 DIAGNOSIS — R20.0 NUMBNESS AND TINGLING: ICD-10-CM

## 2018-08-28 DIAGNOSIS — R20.2 NUMBNESS AND TINGLING: ICD-10-CM

## 2019-03-27 ENCOUNTER — OFFICE VISIT (OUTPATIENT)
Dept: SURGERY | Age: 59
End: 2019-03-27
Payer: MEDICARE

## 2019-03-27 VITALS
BODY MASS INDEX: 26.74 KG/M2 | DIASTOLIC BLOOD PRESSURE: 105 MMHG | SYSTOLIC BLOOD PRESSURE: 166 MMHG | TEMPERATURE: 97.1 F | HEIGHT: 70 IN | HEART RATE: 93 BPM | WEIGHT: 186.8 LBS

## 2019-03-27 DIAGNOSIS — K40.90 RIGHT INGUINAL HERNIA: Primary | ICD-10-CM

## 2019-03-27 PROCEDURE — G8484 FLU IMMUNIZE NO ADMIN: HCPCS | Performed by: SURGERY

## 2019-03-27 PROCEDURE — G8419 CALC BMI OUT NRM PARAM NOF/U: HCPCS | Performed by: SURGERY

## 2019-03-27 PROCEDURE — 99203 OFFICE O/P NEW LOW 30 MIN: CPT | Performed by: SURGERY

## 2019-03-27 PROCEDURE — 1036F TOBACCO NON-USER: CPT | Performed by: SURGERY

## 2019-03-27 PROCEDURE — 3017F COLORECTAL CA SCREEN DOC REV: CPT | Performed by: SURGERY

## 2019-03-27 PROCEDURE — G8427 DOCREV CUR MEDS BY ELIG CLIN: HCPCS | Performed by: SURGERY

## 2019-03-27 PROCEDURE — G8598 ASA/ANTIPLAT THER USED: HCPCS | Performed by: SURGERY

## 2019-03-27 NOTE — PROGRESS NOTES
 Number of children: Not on file    Years of education: Not on file    Highest education level: Not on file   Occupational History    Occupation: disability   Social Needs    Financial resource strain: Not on file    Food insecurity:     Worry: Not on file     Inability: Not on file    Transportation needs:     Medical: Not on file     Non-medical: Not on file   Tobacco Use    Smoking status: Former Smoker     Years: 15.00     Types: Cigarettes     Last attempt to quit: 2001     Years since quittin.9    Smokeless tobacco: Never Used   Substance and Sexual Activity    Alcohol use: No     Alcohol/week: 2.4 oz     Types: 4 Cans of beer per week     Comment: quit drinking 2018    Drug use: No     Types: Marijuana     Comment: last marijuana     Sexual activity: Yes     Partners: Female   Lifestyle    Physical activity:     Days per week: Not on file     Minutes per session: Not on file    Stress: Not on file   Relationships    Social connections:     Talks on phone: Not on file     Gets together: Not on file     Attends [employer] service: Not on file     Active member of club or organization: Not on file     Attends meetings of clubs or organizations: Not on file     Relationship status: Not on file    Intimate partner violence:     Fear of current or ex partner: Not on file     Emotionally abused: Not on file     Physically abused: Not on file     Forced sexual activity: Not on file   Other Topics Concern    Not on file   Social History Narrative    Not on file       Family History:       Problem Relation Age of Onset    Kidney Disease Mother     Heart Disease Mother     Cancer Father        REVIEW OF SYSTEMS:      General: No recent weight gain/loss. Energy level normal for pt. HEENT: Denies sore throat, sinus problems, allergic rhinosinusitis  Cardiovascular: Denies chest pain, palpitations, orthopnea/PND.  Denies h/o murmurs  Pulmonary: Denies cough, shortness of assisted laparoscopic right inguinal hernia repair with mesh, possible open, possible left in OR on next available date. This procedure, including risks, benefits, alternatives and complications have been fully reviewed with patient and informed consent has been obtained. All questions were answered appropriately. Patient's case was discussed with Dr. Felisa Davenport. Electronically signed by Tami Ley DO  on 3/27/2019 at 11:37 AM     I have reviewed the resident's note and I either performed the key elements of the medical history and physical exam or was present with the resident when the key elements of the medical history and physical exam were performed. I have discussed the findings, established the care plan and recommendations with Resident.     Electronically signed by Long Stinson IV, DO on 4/3/19 at 11:33 AM

## 2019-04-01 ENCOUNTER — TELEPHONE (OUTPATIENT)
Dept: SURGERY | Facility: CLINIC | Age: 59
End: 2019-04-01

## 2019-04-01 NOTE — TELEPHONE ENCOUNTER
Patient called needing to reschedule his surgery with  on 4/5 at 0830, if could please call patient back to get him rescheduled, or pass along to [facility] , thank you :)

## 2019-04-02 NOTE — TELEPHONE ENCOUNTER
Patient stated his surgery scheduled for 4- was cancelled because the hospital would not let him be taken home by [facility] transportation. Patient is extremely upset. I told him to call back when he can arrange proper transportation.

## 2019-04-08 ENCOUNTER — TELEPHONE (OUTPATIENT)
Dept: NEUROLOGY | Age: 59
End: 2019-04-08

## 2019-05-31 ENCOUNTER — TELEPHONE (OUTPATIENT)
Dept: SURGERY | Age: 59
End: 2019-05-31

## 2019-05-31 NOTE — TELEPHONE ENCOUNTER
Left message to call the [facility] to schedule 2 week Post Op follow up appointment for June 26,19. Surgery scheduled June 14, 2019, Inguinal Hernia.

## 2019-06-13 RX ORDER — OXCARBAZEPINE 300 MG/1
300 TABLET, FILM COATED ORAL 2 TIMES DAILY
COMMUNITY

## 2019-06-14 ENCOUNTER — ANESTHESIA (OUTPATIENT)
Dept: OPERATING ROOM | Age: 59
End: 2019-06-14
Payer: MEDICARE

## 2019-06-14 ENCOUNTER — HOSPITAL ENCOUNTER (OUTPATIENT)
Age: 59
Setting detail: OUTPATIENT SURGERY
Discharge: HOME | End: 2019-06-14
Attending: SURGERY | Admitting: SURGERY
Payer: MEDICARE

## 2019-06-14 ENCOUNTER — ANESTHESIA EVENT (OUTPATIENT)
Dept: OPERATING ROOM | Age: 59
End: 2019-06-14
Payer: MEDICARE

## 2019-06-14 VITALS
BODY MASS INDEX: 24.92 KG/M2 | HEART RATE: 80 BPM | WEIGHT: 184 LBS | HEIGHT: 72 IN | RESPIRATION RATE: 15 BRPM | DIASTOLIC BLOOD PRESSURE: 80 MMHG | OXYGEN SATURATION: 99 % | SYSTOLIC BLOOD PRESSURE: 163 MMHG | TEMPERATURE: 97.5 F

## 2019-06-14 VITALS — DIASTOLIC BLOOD PRESSURE: 74 MMHG | SYSTOLIC BLOOD PRESSURE: 141 MMHG | TEMPERATURE: 58.6 F | OXYGEN SATURATION: 100 %

## 2019-06-14 DIAGNOSIS — G89.18 POSTOPERATIVE PAIN: Primary | ICD-10-CM

## 2019-06-14 LAB
GFR NON-AFRICAN AMERICAN: >60 ML/MIN
GFR SERPL CREATININE-BSD FRML MDRD: >60 ML/MIN
GFR SERPL CREATININE-BSD FRML MDRD: NORMAL ML/MIN/{1.73_M2}
GLUCOSE BLD-MCNC: 103 MG/DL (ref 74–100)
POC CREATININE: 1.06 MG/DL (ref 0.51–1.19)

## 2019-06-14 PROCEDURE — 3700000000 HC ANESTHESIA ATTENDED CARE: Performed by: SURGERY

## 2019-06-14 PROCEDURE — 3600000019 HC SURGERY ROBOT ADDTL 15MIN: Performed by: SURGERY

## 2019-06-14 PROCEDURE — 6360000002 HC RX W HCPCS: Performed by: ANESTHESIOLOGY

## 2019-06-14 PROCEDURE — 6360000002 HC RX W HCPCS: Performed by: NURSE ANESTHETIST, CERTIFIED REGISTERED

## 2019-06-14 PROCEDURE — 2709999900 HC NON-CHARGEABLE SUPPLY: Performed by: SURGERY

## 2019-06-14 PROCEDURE — C1760 CLOSURE DEV, VASC: HCPCS | Performed by: SURGERY

## 2019-06-14 PROCEDURE — S2900 ROBOTIC SURGICAL SYSTEM: HCPCS | Performed by: SURGERY

## 2019-06-14 PROCEDURE — 3700000001 HC ADD 15 MINUTES (ANESTHESIA): Performed by: SURGERY

## 2019-06-14 PROCEDURE — 2500000003 HC RX 250 WO HCPCS: Performed by: SURGERY

## 2019-06-14 PROCEDURE — 7100000001 HC PACU RECOVERY - ADDTL 15 MIN: Performed by: SURGERY

## 2019-06-14 PROCEDURE — 3600000009 HC SURGERY ROBOT BASE: Performed by: SURGERY

## 2019-06-14 PROCEDURE — 2580000003 HC RX 258: Performed by: ANESTHESIOLOGY

## 2019-06-14 PROCEDURE — 82565 ASSAY OF CREATININE: CPT

## 2019-06-14 PROCEDURE — 7100000000 HC PACU RECOVERY - FIRST 15 MIN: Performed by: SURGERY

## 2019-06-14 PROCEDURE — 7100000011 HC PHASE II RECOVERY - ADDTL 15 MIN: Performed by: SURGERY

## 2019-06-14 PROCEDURE — C1781 MESH (IMPLANTABLE): HCPCS | Performed by: SURGERY

## 2019-06-14 PROCEDURE — 2580000003 HC RX 258: Performed by: NURSE ANESTHETIST, CERTIFIED REGISTERED

## 2019-06-14 PROCEDURE — 2500000003 HC RX 250 WO HCPCS: Performed by: NURSE ANESTHETIST, CERTIFIED REGISTERED

## 2019-06-14 PROCEDURE — 7100000010 HC PHASE II RECOVERY - FIRST 15 MIN: Performed by: SURGERY

## 2019-06-14 PROCEDURE — 93005 ELECTROCARDIOGRAM TRACING: CPT | Performed by: SURGERY

## 2019-06-14 PROCEDURE — 82947 ASSAY GLUCOSE BLOOD QUANT: CPT

## 2019-06-14 DEVICE — MESH SURG W3.5XL6IN POLY SELF FIXATING RECT W/ RESRB PLA: Type: IMPLANTABLE DEVICE | Status: FUNCTIONAL

## 2019-06-14 RX ORDER — OXYCODONE HYDROCHLORIDE AND ACETAMINOPHEN 5; 325 MG/1; MG/1
1 TABLET ORAL EVERY 6 HOURS PRN
Qty: 28 TABLET | Refills: 0 | Status: SHIPPED | OUTPATIENT
Start: 2019-06-14 | End: 2019-06-21

## 2019-06-14 RX ORDER — GLYCOPYRROLATE 1 MG/5 ML
SYRINGE (ML) INTRAVENOUS PRN
Status: DISCONTINUED | OUTPATIENT
Start: 2019-06-14 | End: 2019-06-14 | Stop reason: SDUPTHER

## 2019-06-14 RX ORDER — ONDANSETRON 4 MG/1
4 TABLET, FILM COATED ORAL 3 TIMES DAILY PRN
Qty: 30 TABLET | Refills: 0 | Status: SHIPPED | OUTPATIENT
Start: 2019-06-14

## 2019-06-14 RX ORDER — KETOROLAC TROMETHAMINE 30 MG/ML
INJECTION, SOLUTION INTRAMUSCULAR; INTRAVENOUS PRN
Status: DISCONTINUED | OUTPATIENT
Start: 2019-06-14 | End: 2019-06-14 | Stop reason: SDUPTHER

## 2019-06-14 RX ORDER — PROPOFOL 10 MG/ML
INJECTION, EMULSION INTRAVENOUS PRN
Status: DISCONTINUED | OUTPATIENT
Start: 2019-06-14 | End: 2019-06-14 | Stop reason: SDUPTHER

## 2019-06-14 RX ORDER — SODIUM CHLORIDE, SODIUM LACTATE, POTASSIUM CHLORIDE, CALCIUM CHLORIDE 600; 310; 30; 20 MG/100ML; MG/100ML; MG/100ML; MG/100ML
INJECTION, SOLUTION INTRAVENOUS ONCE
Status: COMPLETED | OUTPATIENT
Start: 2019-06-14 | End: 2019-06-14

## 2019-06-14 RX ORDER — FENTANYL CITRATE 50 UG/ML
INJECTION, SOLUTION INTRAMUSCULAR; INTRAVENOUS PRN
Status: DISCONTINUED | OUTPATIENT
Start: 2019-06-14 | End: 2019-06-14 | Stop reason: SDUPTHER

## 2019-06-14 RX ORDER — SODIUM CHLORIDE, SODIUM LACTATE, POTASSIUM CHLORIDE, CALCIUM CHLORIDE 600; 310; 30; 20 MG/100ML; MG/100ML; MG/100ML; MG/100ML
INJECTION, SOLUTION INTRAVENOUS CONTINUOUS PRN
Status: DISCONTINUED | OUTPATIENT
Start: 2019-06-14 | End: 2019-06-14 | Stop reason: SDUPTHER

## 2019-06-14 RX ORDER — FENTANYL CITRATE 50 UG/ML
INJECTION, SOLUTION INTRAMUSCULAR; INTRAVENOUS
Status: DISCONTINUED
Start: 2019-06-14 | End: 2019-06-14 | Stop reason: WASHOUT

## 2019-06-14 RX ORDER — BUPIVACAINE HYDROCHLORIDE AND EPINEPHRINE 5; 5 MG/ML; UG/ML
INJECTION, SOLUTION EPIDURAL; INTRACAUDAL; PERINEURAL PRN
Status: DISCONTINUED | OUTPATIENT
Start: 2019-06-14 | End: 2019-06-14 | Stop reason: ALTCHOICE

## 2019-06-14 RX ORDER — NEOSTIGMINE METHYLSULFATE 5 MG/5 ML
SYRINGE (ML) INTRAVENOUS PRN
Status: DISCONTINUED | OUTPATIENT
Start: 2019-06-14 | End: 2019-06-14 | Stop reason: SDUPTHER

## 2019-06-14 RX ORDER — ONDANSETRON 2 MG/ML
INJECTION INTRAMUSCULAR; INTRAVENOUS PRN
Status: DISCONTINUED | OUTPATIENT
Start: 2019-06-14 | End: 2019-06-14 | Stop reason: SDUPTHER

## 2019-06-14 RX ORDER — DEXAMETHASONE SODIUM PHOSPHATE 10 MG/ML
INJECTION INTRAMUSCULAR; INTRAVENOUS PRN
Status: DISCONTINUED | OUTPATIENT
Start: 2019-06-14 | End: 2019-06-14 | Stop reason: SDUPTHER

## 2019-06-14 RX ORDER — LIDOCAINE HYDROCHLORIDE 10 MG/ML
INJECTION, SOLUTION EPIDURAL; INFILTRATION; INTRACAUDAL; PERINEURAL PRN
Status: DISCONTINUED | OUTPATIENT
Start: 2019-06-14 | End: 2019-06-14 | Stop reason: SDUPTHER

## 2019-06-14 RX ORDER — ROCURONIUM BROMIDE 10 MG/ML
INJECTION, SOLUTION INTRAVENOUS PRN
Status: DISCONTINUED | OUTPATIENT
Start: 2019-06-14 | End: 2019-06-14 | Stop reason: SDUPTHER

## 2019-06-14 RX ORDER — BUPIVACAINE HYDROCHLORIDE 2.5 MG/ML
INJECTION, SOLUTION INFILTRATION; PERINEURAL PRN
Status: DISCONTINUED | OUTPATIENT
Start: 2019-06-14 | End: 2019-06-14 | Stop reason: ALTCHOICE

## 2019-06-14 RX ADMIN — LIDOCAINE HYDROCHLORIDE 50 MG: 10 INJECTION, SOLUTION EPIDURAL; INFILTRATION; INTRACAUDAL; PERINEURAL at 12:07

## 2019-06-14 RX ADMIN — DEXAMETHASONE SODIUM PHOSPHATE 10 MG: 10 INJECTION INTRAMUSCULAR; INTRAVENOUS at 12:16

## 2019-06-14 RX ADMIN — FENTANYL CITRATE 50 MCG: 50 INJECTION INTRAMUSCULAR; INTRAVENOUS at 12:36

## 2019-06-14 RX ADMIN — PROPOFOL 20 MG: 10 INJECTION, EMULSION INTRAVENOUS at 12:34

## 2019-06-14 RX ADMIN — Medication 3 MG: at 13:37

## 2019-06-14 RX ADMIN — KETOROLAC TROMETHAMINE 30 MG: 30 INJECTION, SOLUTION INTRAMUSCULAR at 13:37

## 2019-06-14 RX ADMIN — SODIUM CHLORIDE, POTASSIUM CHLORIDE, SODIUM LACTATE AND CALCIUM CHLORIDE: 600; 310; 30; 20 INJECTION, SOLUTION INTRAVENOUS at 11:30

## 2019-06-14 RX ADMIN — SODIUM CHLORIDE, POTASSIUM CHLORIDE, SODIUM LACTATE AND CALCIUM CHLORIDE: 600; 310; 30; 20 INJECTION, SOLUTION INTRAVENOUS at 12:01

## 2019-06-14 RX ADMIN — FENTANYL CITRATE 50 MCG: 50 INJECTION INTRAMUSCULAR; INTRAVENOUS at 12:21

## 2019-06-14 RX ADMIN — FENTANYL CITRATE 50 MCG: 50 INJECTION INTRAMUSCULAR; INTRAVENOUS at 12:40

## 2019-06-14 RX ADMIN — ROCURONIUM BROMIDE 40 MG: 10 INJECTION INTRAVENOUS at 12:07

## 2019-06-14 RX ADMIN — ROCURONIUM BROMIDE 10 MG: 10 INJECTION INTRAVENOUS at 12:34

## 2019-06-14 RX ADMIN — Medication 0.6 MG: at 13:37

## 2019-06-14 RX ADMIN — FENTANYL CITRATE 50 MCG: 50 INJECTION INTRAMUSCULAR; INTRAVENOUS at 12:07

## 2019-06-14 RX ADMIN — ONDANSETRON 4 MG: 2 INJECTION, SOLUTION INTRAMUSCULAR; INTRAVENOUS at 13:37

## 2019-06-14 RX ADMIN — PROPOFOL 180 MG: 10 INJECTION, EMULSION INTRAVENOUS at 12:07

## 2019-06-14 RX ADMIN — Medication 2 G: at 12:16

## 2019-06-14 ASSESSMENT — PULMONARY FUNCTION TESTS
PIF_VALUE: 26
PIF_VALUE: 25
PIF_VALUE: 25
PIF_VALUE: 18
PIF_VALUE: 25
PIF_VALUE: 26
PIF_VALUE: 19
PIF_VALUE: 28
PIF_VALUE: 18
PIF_VALUE: 21
PIF_VALUE: 0
PIF_VALUE: 28
PIF_VALUE: 18
PIF_VALUE: 25
PIF_VALUE: 26
PIF_VALUE: 25
PIF_VALUE: 23
PIF_VALUE: 16
PIF_VALUE: 25
PIF_VALUE: 35
PIF_VALUE: 19
PIF_VALUE: 18
PIF_VALUE: 21
PIF_VALUE: 25
PIF_VALUE: 18
PIF_VALUE: 24
PIF_VALUE: 25
PIF_VALUE: 28
PIF_VALUE: 26
PIF_VALUE: 3
PIF_VALUE: 12
PIF_VALUE: 27
PIF_VALUE: 18
PIF_VALUE: 28
PIF_VALUE: 22
PIF_VALUE: 25
PIF_VALUE: 25
PIF_VALUE: 20
PIF_VALUE: 18
PIF_VALUE: 25
PIF_VALUE: 19
PIF_VALUE: 25
PIF_VALUE: 26
PIF_VALUE: 28
PIF_VALUE: 0
PIF_VALUE: 28
PIF_VALUE: 22
PIF_VALUE: 25
PIF_VALUE: 19
PIF_VALUE: 22
PIF_VALUE: 28
PIF_VALUE: 1
PIF_VALUE: 28
PIF_VALUE: 26
PIF_VALUE: 25
PIF_VALUE: 0
PIF_VALUE: 5
PIF_VALUE: 28
PIF_VALUE: 18
PIF_VALUE: 26
PIF_VALUE: 26
PIF_VALUE: 2
PIF_VALUE: 26
PIF_VALUE: 28
PIF_VALUE: 26
PIF_VALUE: 28
PIF_VALUE: 25
PIF_VALUE: 18
PIF_VALUE: 20
PIF_VALUE: 23
PIF_VALUE: 12
PIF_VALUE: 26
PIF_VALUE: 1
PIF_VALUE: 12
PIF_VALUE: 22
PIF_VALUE: 25
PIF_VALUE: 28
PIF_VALUE: 26
PIF_VALUE: 22
PIF_VALUE: 0
PIF_VALUE: 25
PIF_VALUE: 28
PIF_VALUE: 12
PIF_VALUE: 26
PIF_VALUE: 25
PIF_VALUE: 25
PIF_VALUE: 26
PIF_VALUE: 25
PIF_VALUE: 28
PIF_VALUE: 25
PIF_VALUE: 18
PIF_VALUE: 22
PIF_VALUE: 3
PIF_VALUE: 14
PIF_VALUE: 16
PIF_VALUE: 1
PIF_VALUE: 23
PIF_VALUE: 28
PIF_VALUE: 25
PIF_VALUE: 19
PIF_VALUE: 26
PIF_VALUE: 1
PIF_VALUE: 25
PIF_VALUE: 26
PIF_VALUE: 19

## 2019-06-14 ASSESSMENT — PAIN DESCRIPTION - PAIN TYPE: TYPE: SURGICAL PAIN

## 2019-06-14 ASSESSMENT — PAIN SCALES - GENERAL
PAINLEVEL_OUTOF10: 7

## 2019-06-14 ASSESSMENT — ENCOUNTER SYMPTOMS
STRIDOR: 0
SHORTNESS OF BREATH: 0

## 2019-06-14 ASSESSMENT — PAIN - FUNCTIONAL ASSESSMENT: PAIN_FUNCTIONAL_ASSESSMENT: 0-10

## 2019-06-14 ASSESSMENT — PAIN DESCRIPTION - LOCATION: LOCATION: ABDOMEN

## 2019-06-14 NOTE — OP NOTE
Operative Note:      DATE OF PROCEDURE:  06/14/19  PATIENT NAME:  Dez Riggs  MRN: [id number]    SURGEON:  Dr. Kirti Vitale    ASSISTANT:  Dr. Rogerio Hall , PGY-5  Gardenia Sweeney, MS-3    PREOPERATIVE DIAGNOSIS:  Right Inguinal hernia    POSTOPERATIVE DIAGNOSIS:  Right indirect inguinal hernia    OPERATION: Right laparoscopic robotic assisted inguinal hernia repair with mesh    ANESTHESIA: General    ESTIMATED BLOOD LOSS: 5 ml    FLUIDS: 500 ml crystalloid. COMPLICATIONS: None. WOUND CLASS:  1    SPECIMENS:  Was not obtained    HISTORY: The patient is a 66year old male with history of right inguinal hernia. Management options were discussed and patient elected to pursue robotic assisted laparoscopic right inguinal hernia repair, possible open, possible bilateral.    CONSENT:     Planned procedure was explained in detail including discussion of all associated risks/benefits. All questions/concerns were addressed and informed consent was obtained, witnessed by nurse, and placed on the pt's chart. PROCEDURE:   Patient was brought back to the operative suite and placed in the supine position. A time out was completed confirming identification of proper patient, position, and procedure to be preformed. Antibiotics of ancef were given per SCIP protocol. EPC cuffs were placed for DVT prophylaxis. Etienne catheter was placed. Abdomen and groin regions were shaved with clippers. General anesthesia was induced and the patient was prepped and draped in normal sterile fashion. A veress needle was placed in the left upper quadrant and a pneumoperitoneum was created with insufflation of carbon dioxide to 15 mmHg. A 8mm left upper quadrant airseal port was placed using optiview. Three additional 8 mm ports were placed under direct visualization; supraumbilical, 10 cm to the right of supraumbilical port, and 10 cm to the left of supraumbilical port. The robot was docked without complication.      A 30 degree down scope was placed into the abdomen. Both inguinal regions were inspected and the median umbilical ligament, medial umbilical ligamenst, and lateral umbilical folds were identified. A right indirect inguinal hernia was identified. There was no inguinal hernia on the left side. The peritoneum on the right side was incised with hook electrocautery along a line 2 cm above the superior edge of the hernia defect, extending from the lateral umbilical fold to the anterior superior iliac spine. The peritoneal flap was mobilized inferiorly using blunt dissection. The inferior epigastric vessels were exposed and kept superior to the dissection planes at all times during the operation. Blunt dissection was down from the ASIS to the lateral edge of the internal ring. Medial dissection was done until Deric's ligament was exposed. The cord structures and hernia sac were identified. The cord structures were dissected free from the hernia sac circumferentially. The hernia sac containing fat was reduced from the internal ring. The hernia was an indirect hernia. Hemostasis was maintained. Once dissection was completed progrip mesh was placed within the peritoneum flap over the cord structures and hernia defect. It was noted to lay flat, in good position and without crimpage. The peritoneum was closed with a running  3-0 V lock suture. The robot was undocked without complications. The 8mm robotic ports were removed under direct visualization. No bleeding was noted. The umbilical port was removed. The skin incisions were closed with single interrupted 4-0 monocryl. 0.5% marcaine with epi was used to anesthetize the skin incisions. Skin adhesive was applied over the incisions. All sponge and instrument counts were correct at the end of the procedure. The patient tolerated the procedure well, was awakened from anesthesia, and was transported to PACU in stable condition. Dr. Stephanie Lutz was present for the entire procedure.

## 2019-06-14 NOTE — BRIEF OP NOTE
Brief Postoperative Note  ______________________________________________________________    Patient: Kip Waterman  YOB: 1960  MRN: [id number]  Date of Procedure: 6/14/2019    Pre-Op Diagnosis: RIGHT INGUINAL HERNIA    Post-Op Diagnosis: Right indirect inguinal hernia       Procedure(s):  Right laparoscopic robotic assisted inguinal hernia repair with mesh    Anesthesia: General    Surgeon(s):  Srini Angulo IV, DO    Assistant: Dr. Kayli Jones, PGY-4  [facility], MS-3    Estimated Blood Loss (mL): 5ml    IVF:  384UH    Complications: None    Specimens:   none    Implants:  Implant Name Type Inv.  Item Serial No.  Lot No. LRB No. Used   IMPL MESH PARIETEX PROGRIP [id number] Mesh IMPL MESH PARIETEX PROGRIP [id number]  [facility] SURGICAL [id number] Right 1         Drains:   Urethral Catheter Double-lumen (Active)       Findings: right indirect inguinal hernia    Kayli Jones DO  Date: 6/14/2019  Time: 2:16 PM

## 2019-06-14 NOTE — ANESTHESIA PRE PROCEDURE
Department of Anesthesiology  Preprocedure Note       Name:  Ita Nascimento   Age:  66 y.o.  :  1960                                          MRN:  [id number]         Date:  2019      Surgeon: James Page):  Hugo Stinson IV, DO    Procedure: LAPAROSCOPIC ROBOTIC INGUINAL HERNIA WITH MESH, POSSIBLE BILATERAL, POSSIBLE OPEN (Right )    Medications prior to admission:   Prior to Admission medications    Medication Sig Start Date End Date Taking? Authorizing Provider   oxyCODONE-acetaminophen (PERCOCET) 5-325 MG per tablet Take 1 tablet by mouth every 6 hours as needed for Pain for up to 7 days. Intended supply: 7 days. Take lowest dose possible to manage pain 19 Yes Jewel Dwyer DO   [facility]etron [facility]) 4 MG tablet Take 1 tablet by mouth 3 times daily as needed for Nausea or Vomiting 19  Yes Jewel Dwyer DO   OXcarbazepine (TRILEPTAL) 300 MG tablet Take 300 mg by mouth 2 times daily   Yes Historical Provider, MD   Fluticasone-Salmeterol (ADVAIR DISKUS IN) Inhale 2 puffs into the lungs 4 times daily   Yes Historical Provider, MD   traMADol (ULTRAM) 50 MG tablet Take 50 mg by mouth nightly as needed. 4/10/18  Yes Historical Provider, MD   traZODone (DESYREL) 50 MG tablet Take 50 mg by mouth nightly 4/10/18  Yes Historical Provider, MD   DULoxetine (CYMBALTA) 60 MG extended release capsule Take 1 capsule by mouth daily 18  Yes Louise Nagy MD   ipratropium-albuterol (DUONEB) 0.5-2.5 (3) MG/3ML SOLN nebulizer solution Take 3 mLs by nebulization 3 times daily    Yes Historical Provider, MD   Plecanatide (TRULANCE) 3 MG TABS Take by mouth daily as needed    Yes Historical Provider, MD   aspirin 81 MG tablet Take 1 tablet by mouth daily  Patient taking differently: Take 81 mg by mouth daily Pt.  Stopped 19 for OR 18  Yes MARIE Pichardo   atorvastatin (LIPITOR) 40 MG tablet Take 1 tablet by mouth daily  Patient taking differently: Take 40 mg by mouth nightly  18 Yes MARIE Pichardo   carvedilol (COREG) 25 MG tablet Take 1 tablet by mouth 2 times daily 2/6/18  Yes MARIE Pichardo   albuterol (PROVENTIL) (2.5 MG/3ML) 0.083% nebulizer solution Take 2.5 mg by nebulization every 6 hours as needed for Wheezing   Yes Historical Provider, MD   BD INTEGRA SYRINGE 25G X 1\" 3 ML MISC  1/26/18   Historical Provider, MD   clopidogrel (PLAVIX) 75 MG tablet Take 75 mg by mouth daily Pt. Stopped 6-7-19 for OR    Historical Provider, MD   nitroGLYCERIN (NITROSTAT) 0.4 MG SL tablet Place 0.4 mg under the tongue every 5 minutes as needed    Historical Provider, MD       Current medications:    Current Facility-Administered Medications   Medication Dose Route Frequency Provider Last Rate Last Dose    ceFAZolin (ANCEF) 2 g in dextrose 5 % 50 mL IVPB  2 g Intravenous On Call to Christie Rondon MD           Allergies:  No Known Allergies    Problem List:    Patient Active Problem List   Diagnosis Code    Cerebrovascular accident (CVA) ([address] I63.9    CAD (coronary artery disease) of artery bypass graft I25.810    Hyperlipidemia E78.5    COPD (chronic obstructive pulmonary disease) (HCC) J44.9    Left arm numbness R20.0    Peripheral vascular disease ([address] I73.9    Hypertension I10       Past Medical History:        Diagnosis Date    Arthritis     bilateral shoulders and wrists    Bipolar 1 disorder ([address]     CAD (coronary artery disease) 2016    MI. CABG BYPASS X3. 3 STENTS PLACED    CAD (coronary artery disease) 2017    3 STENTS PLACED/ Dr. Whitney ely    Cerebrovascular disease     Chronic back pain     COPD (chronic obstructive pulmonary disease) ([address]     CVA (cere[facility] vascular accident) [facility] 05/2016    LEFT SIDED WEAKNESS.     GERD (gastroesophageal reflux disease)     Groin pain, right     History of blood transfusion     no reaction    Hyperlipidemia     PCP Dr. Mei/ daniel    Hypertension     Insomnia     Left-sided weakness     [facility] infarction) [facility] 2016    Neuropathy     BILATERAL LEGS    [facility]     Peripheral vascular disease ([facility]     Right inguinal hernia     Use of cane as ambulatory aid     Uses walker     Wears dentures     FULL UPPERS AND LOWERS    Wears glasses        Past Surgical History:        Procedure Laterality Date    CARDIAC SURGERY  2016     Cabg 3 vessel, cath   [facility] CARDIAC SURGERY  2017    6 STENTS PLACED    CHOLECYSTECTOMY  2016    COLONOSCOPY      VASCULAR SURGERY Right     leg       Social History:    Social History     Tobacco Use    Smoking status: Former Smoker     Packs/day: 1.50     Years: 20.00     Pack years: 30.00     Types: Cigarettes     Last attempt to quit: 2001     Years since quittin.2    Smokeless tobacco: Never Used   Substance Use Topics    Alcohol use: Yes     Alcohol/week: 3.6 oz     Types: 6 Cans of beer per week     Comment: weekly                                Counseling given: Not Answered      Vital Signs (Current):   Vitals:    19 1341 19 1057 19 1108   BP:   (!) 152/93   Pulse:   82   Resp:   18   Temp:   97.2 °F (36.2 °C)   TempSrc:   Temporal   SpO2:   98%   Weight: 180 lb (81.6 kg) 184 lb (83.5 kg)    Height: 6' 1\" (1.854 m) 6' (1.829 m)                                               BP Readings from Last 3 Encounters:   19 (!) 152/93   19 (!) 166/105   18 96/61       NPO Status: Time of last liquid consumption: 2300                        Time of last solid consumption: 1700                        Date of last liquid consumption: 19                        Date of last solid food consumption: 19    BMI:   Wt Readings from Last 3 Encounters:   19 184 lb (83.5 kg)   19 180 lb (81.6 kg)   19 186 lb 12.8 oz (84.7 kg)     Body mass index is 24.95 kg/m².     CBC:   Lab Results   Component Value Date    WBC 6.7 2017    RBC 4.09 2017    HGB 11.9 2017 HCT 35.5 02/26/2017    MCV 86.7 02/26/2017    RDW 14.8 02/26/2017     02/26/2017       CMP:   Lab Results   Component Value Date     02/26/2017    K 4.2 02/26/2017     02/26/2017    CO2 23 02/26/2017    BUN 17 02/26/2017    CREATININE 1.06 06/14/2019    CREATININE 1.07 02/26/2017    GFRAA >60 02/26/2017    LABGLOM >60 06/14/2019    GLUCOSE 95 02/26/2017    CALCIUM 9.1 02/26/2017       POC Tests:   Recent Labs     06/14/19  1127   POCGLU 103*       Coags: No results found for: PROTIME, INR, APTT    HCG (If Applicable): No results found for: PREGTESTUR, PREGSERUM, HCG, HCGQUANT     ABGs: No results found for: PHART, PO2ART, YZX3KVL, UDL5QCG, BEART, Q0LMDJKD     Type & Screen (If Applicable):  No results found for: LABABO, LABRH    Anesthesia Evaluation   no history of anesthetic complications:   Airway: Mallampati: II     Neck ROM: full  Mouth opening: > = 3 FB Dental:    (+) upper dentures and lower dentures      Pulmonary:   (+) COPD:      (-) asthma, shortness of breath, sleep apnea and stridor                           Cardiovascular:    (+) hypertension:, past MI:, CAD:, CABG/stent:,     (-) pacemaker,  angina and  CHF        Rate: normal                    Neuro/Psych:   (+) CVA (left side weakness): residual symptoms, depression/anxiety    (-) seizures           GI/Hepatic/Renal:   (+) GERD:,      (-) liver disease and no renal disease       Endo/Other:        (-) diabetes mellitus, hypothyroidism, hyperthyroidism, blood dyscrasia               Abdominal:       Abdomen: soft. Vascular:                                   Narrative   Performed by: LEIA STTAMMI MUSE   Normal sinus rhythm  Normal ECG  No previous ECGs available   Lab and Collection     EKG 12 Lead - 6/14/2019        Anesthesia Plan      general     ASA 4       Induction: intravenous. Anesthetic plan and risks discussed with patient. IMPRESSIONS/PLAN  Encounter Diagnoses   Name Primary?     Coronary artery disease involving native coronary artery of native heart without angina pectoris Yes    Presence of coronary angioplasty implant and graft    Chest pain, unspecified type    Essential hypertension     Asymptomatic  Normal EKG  Currently incarcerated  Awaiting elective hernia surgery. Patient is cleared to proceed with his hernia surgery at a moderate, non prohibitive risk from a cardiac perspective. Advised he needs to make sure he takes his cardiac medications day of surgery. Continue dual anti-platelet therapy with aspirin and Plavix  If needed can stop Plavix 5 days prior to and then resume post operatively. To remain on aspirin 81 daily. _______________________  Ernesta Headings  No orders of the defined types were placed in this encounter.    _______________________  FOLLOW UP  No Follow-up on file. PCP: Ember Sahu MD  Referring Physician: Suyapa Watson MD  [address]  02/05/19 2030      Electronically signed by Bin Wood MD at 02/05/2019 10:52 AM [facility] Echocardiogram      Study  2D STAT echo in the cath lab. The left ventricle is normal in size. Mild concentric LVH/increased wall  thickness is present. Normal left ventricular size and function are noted. The right ventricle is normal size. The mitral valve leaflets appear thickened, but open well. There is mild  mitral annular calcification. There is no mitral valve stenosis. There is  trace mitral regurgitation. The aortic valve opens well. There is no pericardial effusion. Summary  2D STAT echo in the cath lab. There is no pericardial effusion. Normal left ventricular size and function are noted. ____________________________________________________________________________        Reading Physician:  Mari Knutson.  MAGDA Kong constitutes an electronic signature  on 06/22/2016                    17:17

## 2019-06-14 NOTE — H&P
[address] [facility]        PATIENT NAME: Jamarcus Montalvo   YOB: 1960  GENDER: male     Procedure Date: 6/14/2019 10:03 AM     Attending Provider: Dr. Torres Penny     Chief Complaint: right inguinal hernia    Procedure Scheduled: Robotic assisted laparoscopic inguinal hernia repair    History of present Illness: The patient is a 66 y.o. male  who presents to pre-op area prior to scheduled robotic assisted laparoscopic inguinal hernia repair. Pt last seen in office on 3/27/19 w/ c/o right inguinal bulge. Pt found to have right inguinal hernia. Pt w/ h/o CAD s/p CABG and cardiac stents. Cardiac clearance verified w/ pt recommended to stop ASA and plavix at least 5 days prior to OR. Pt states he last took ASA and plavis 6/7/19. Pt recommended to undergo robotic assisted laparoscopic right inguinal hernia repair possible open, possible bilat at earliest possible convenience. Pt denies changes to his medical history since he was last seen in office. Denies current nausea, vomiting, chest pain, SOB, fever, and chills. Consent form signed in office reviewed w/ pt. Any/all additional questions/concerns were addressed and consent form updated w/ current date. Pt elected to pursue robotic assisted laparoscopic right inguinal hernia repair possible open, possible bilat as scheduled for today.      Past Medical History:  has a past medical history of Arthritis, Bipolar 1 disorder ([address] CAD (coronary artery disease), CAD (coronary artery disease), Cerebrovascular disease, Chronic back pain, COPD (chronic obstructive pulmonary disease) ([address] CVA (cerebral vascular accident) ([address] GERD (gastroesophageal reflux disease), Groin pain, right, History of blood transfusion, Hyperlipidemia, Hypertension, Insomnia, Left-sided weakness, MI (myocardial infarction) ([address] Neuropathy, PAD (peripheral artery disease) ([address] Peripheral vascular disease ([address] Right inguinal hernia, Use of cane as ambulatory aid, Uses walker, Wears dentures, and Wears glasses. Past Surgical History:   Past Surgical History:   Procedure Laterality Date    CARDIAC SURGERY  06/2016     Cabg 3 vessel, cath   [facility] CARDIAC SURGERY  2017    6 STENTS PLACED    CHOLECYSTECTOMY  2016    COLONOSCOPY      VASCULAR SURGERY Right     leg       Social History:  reports that he quit smoking about 18 years ago. His smoking use included cigarettes. He has a 30.00 pack-year smoking history. He has never used smokeless tobacco. He reports that he drinks about 3.6 oz of alcohol per week. He reports that he does not use drugs. Family History: family history includes Cancer in his father; Heart Disease in his mother; High Blood Pressure in his mother; Kidney Disease in his mother. Review of Systems:   Negative except as listed above    Allergies: Patient has no known allergies. Current Meds:  Current Facility-Administered Medications:     ceFAZolin (ANCEF) 2 g in dextrose 5 % 50 mL IVPB, 2 g, Intravenous, On Call to [address] [place] [address], Arpit Doty MD    lactated ringers infusion, , Intravenous, Once, Arpit Doty MD    ceFAZolin (ANCEF) 2 g in dextrose 5 % 50 mL IVPB, 2 g, Intravenous, Once, [employer] IV, DO    Vital Signs: There were no vitals filed for this visit.     Physical Exam:  Vital signs and Nurse's note reviewed  Gen:  A&Ox3, NAD  HEENT: NCAT, PERRLA, EOMI, no scleral icterus, oral mucosa moist  Neck: Supple, trachea midline  Chest: Symmetric rise with inhalation, no evidence of trauma  CVS: Regular rate and rhythm, no murmurs, no rubs or gallops   Resp: Good bilateral air entry,  No increased WOB  Abd: soft, non-tender, non-distended  Ext: No clubbing, cyanosis, edema, peripheral pulses 2+ Rad/Fem/DP/PT  CNS: Moves all extremities, no gross focal motor deficits  Skin: No erythema or ulcerations     Labs:   Lab Results   Component Value Date    WBC 6.7 02/26/2017    HGB 11.9 02/26/2017    HCT 35.5 02/26/2017 MCV 86.7 02/26/2017     02/26/2017     Lab Results   Component Value Date     02/26/2017    K 4.2 02/26/2017     02/26/2017    CO2 23 02/26/2017    BUN 17 02/26/2017    CREATININE 1.07 02/26/2017    GLUCOSE 95 02/26/2017    CALCIUM 9.1 02/26/2017     No results found for: ALKPHOS, ALT, AST, PROT, BILITOT, BILIDIR, LABALBU  No results found for: LACTA  No results found for: AMYLASE  No results found for: LIPASE  No results found for: INR      Radiology:  No results found. Assessment:  Patient Active Problem List   Diagnosis    [facility]) [facility]    CAD (coronary artery disease) of artery bypass graft    Hyperlipidemia    COPD (chronic obstructive pulmonary disease) ([facility]    Left arm numbness    Peripheral vascular disease ([facility]    Hypertension         Plan:  1. Proceed w/ robotic assisted laparoscopic right inguinal hernia repair possible open, possible bilat as scheduled for today  2.  Pt to be [facility] 28 home post procedure    Electronically signed by Clay Garcia DO  on 6/14/2019 at 10:51 AM

## 2019-06-14 NOTE — ANESTHESIA POSTPROCEDURE EVALUATION
Department of Anesthesiology  Postprocedure Note    Patient: Loco Kim  MRN: [id number]  [place]: 1960  Date of evaluation: 6/14/2019  Time:  2:12 PM     Procedure Summary     Date:  06/14/19 Room / Location:  [facility] OR  / [facility] OR    Anesthesia Start:  1201 Anesthesia Stop:  3638    Procedure:  LAPAROSCOPIC ROBOTIC INGUINAL HERNIA WITH MESH (Right Abdomen) Diagnosis:  (RIGHT INGUINAL HERNIA)    Surgeon:  Hannah Lemon DO Responsible Provider:  Kishore Aparicio MD    Anesthesia Type:  general ASA Status:  4          Anesthesia Type: general    Emigdio Phase I:      Emigdio Phase II:      Last vitals: Reviewed and per EMR flowsheets.        Anesthesia Post Evaluation    Patient location during evaluation: PACU  Patient participation: complete - patient participated  Level of consciousness: awake and alert  Pain score: 2  Airway patency: patent  Nausea & Vomiting: no nausea and no vomiting  Complications: no  Cardiovascular status: hemodynamically stable  Respiratory status: acceptable, nasal cannula and room air  Hydration status: stable

## 2019-06-15 LAB
EKG ATRIAL RATE: 76 BPM
EKG P AXIS: 51 DEGREES
EKG P-R INTERVAL: 156 MS
EKG Q-T INTERVAL: 398 MS
EKG QRS DURATION: 90 MS
EKG QTC CALCULATION (BAZETT): 447 MS
EKG R AXIS: 55 DEGREES
EKG T AXIS: 28 DEGREES
EKG VENTRICULAR RATE: 76 BPM

## 2019-06-26 ENCOUNTER — OFFICE VISIT (OUTPATIENT)
Dept: SURGERY | Age: 59
End: 2019-06-26
Payer: MEDICARE

## 2019-06-26 VITALS
DIASTOLIC BLOOD PRESSURE: 80 MMHG | SYSTOLIC BLOOD PRESSURE: 128 MMHG | BODY MASS INDEX: 25.03 KG/M2 | WEIGHT: 184.8 LBS | HEART RATE: 69 BPM | TEMPERATURE: 97.2 F | HEIGHT: 72 IN

## 2019-06-26 DIAGNOSIS — Z09 POSTOP CHECK: Primary | ICD-10-CM

## 2019-06-26 PROCEDURE — 99024 POSTOP FOLLOW-UP VISIT: CPT | Performed by: STUDENT IN AN ORGANIZED HEALTH CARE EDUCATION/TRAINING PROGRAM

## 2019-06-26 RX ORDER — CYCLOBENZAPRINE HCL 10 MG
10 TABLET ORAL 2 TIMES DAILY PRN
Qty: 30 TABLET | Refills: 0 | Status: SHIPPED | OUTPATIENT
Start: 2019-06-26 | End: 2019-07-06

## 2019-06-26 NOTE — PROGRESS NOTES
were performed. I have discussed the findings, established the care plan and recommendations with Resident.     Electronically signed by Nargis Stinson IV, DO on 7/3/19 at 10:49 AM

## 2019-07-11 ENCOUNTER — TELEPHONE (OUTPATIENT)
Dept: FAMILY MEDICINE CLINIC | Age: 59
End: 2019-07-11

## 2023-10-02 ENCOUNTER — APPOINTMENT (OUTPATIENT)
Dept: OTOLARYNGOLOGY | Facility: CLINIC | Age: 63
End: 2023-10-02
Payer: MEDICARE

## 2023-11-09 ENCOUNTER — APPOINTMENT (OUTPATIENT)
Dept: OTOLARYNGOLOGY | Facility: CLINIC | Age: 63
End: 2023-11-09
Payer: MEDICARE

## 2024-04-10 ENCOUNTER — INITIAL CONSULT (OUTPATIENT)
Dept: NEUROSURGERY | Age: 64
End: 2024-04-10

## 2024-04-10 VITALS
HEIGHT: 70 IN | BODY MASS INDEX: 30.21 KG/M2 | SYSTOLIC BLOOD PRESSURE: 126 MMHG | DIASTOLIC BLOOD PRESSURE: 84 MMHG | TEMPERATURE: 97 F | WEIGHT: 211 LBS

## 2024-04-10 DIAGNOSIS — M25.511 CHRONIC RIGHT SHOULDER PAIN: Primary | ICD-10-CM

## 2024-04-10 DIAGNOSIS — M48.02 CERVICAL STENOSIS OF SPINE: ICD-10-CM

## 2024-04-10 DIAGNOSIS — G89.29 CHRONIC RIGHT SHOULDER PAIN: Primary | ICD-10-CM

## 2024-04-10 ASSESSMENT — ENCOUNTER SYMPTOMS
COUGH: 0
SHORTNESS OF BREATH: 0
TROUBLE SWALLOWING: 0
ABDOMINAL DISTENTION: 0
ABDOMINAL PAIN: 0
BACK PAIN: 1
EYE PAIN: 0

## 2024-04-10 NOTE — PROGRESS NOTES
Patient Name: Omid Pollard : 1960        Date: 4/10/2024      Type of Appt: Consult    Reason for appt: CS:  M62.838 (ICD-10-CM) - Cervical paraspinal muscle spasm  M54.12 (ICD-10-CM) - C7 radiculopathy  M47.816 (ICD-10-CM) - Lumbar spondylosis  M54.16 (ICD-10-CM) - Lumbar radiculopathy  G54.0 (ICD-10-CM) - Brachial plexopathy     Referred by:   Fernando Cary MD     Studies done: 2024 - MRI LUMBAR SPINE WO CONTRAST @ [facility]  2024 - MRI CERVICAL SPINE WO CONTRAST @ [facility]    Physical therapy: FOR LEFT KNEE @ [facility]    Smoking: Yes or No    REVIEW OF SYSTEMS:    Rash   Difficulty Urinating Nausea     Fever   Headaches  Bruising/Bleeding Easily     Hearing loss  Constipation  Sleep Disturbance    Shortness of breath Diarrhea  Neck Pain  Back Pain   
Simin Escalante CPNP   nitroGLYCERIN (NITROSTAT) 0.4 MG SL tablet Place 0.4 mg under the tongue every 5 minutes as needed    ProviderRocio MD   albuterol (PROVENTIL) (2.5 MG/3ML) 0.083% nebulizer solution Take 2.5 mg by nebulization every 6 hours as needed for Wheezing    Provider, MD Rocio           Allergies:   No Known Allergies    Social History:      TOBACCO:   reports that he quit smoking about 23 years ago. His smoking use included cigarettes. He started smoking about 43 years ago. He has a 30.0 pack-year smoking history. He has never used smokeless tobacco.  ETOH:   reports current alcohol use of about 6.0 standard drinks of alcohol per week.  RECREATIONAL DRUG USE:   Social History     Substance and Sexual Activity   Drug Use No       Family History:           Problem Relation Age of Onset    Kidney Disease Mother         DIALYSIS    Heart Disease Mother     High Blood Pressure Mother     Cancer Father              Review of Systems   Constitutional:  Negative for activity change and unexpected weight change.   HENT:  Negative for hearing loss and trouble swallowing.    Eyes:  Negative for pain and visual disturbance.   Respiratory:  Negative for cough and shortness of breath.    Cardiovascular:  Negative for chest pain and leg swelling.   Gastrointestinal:  Negative for abdominal distention and abdominal pain.   Genitourinary:  Negative for difficulty urinating and urgency.   Musculoskeletal:  Positive for back pain and neck pain.   Neurological:  Positive for weakness. Negative for numbness.   Hematological:  Negative for adenopathy. Does not bruise/bleed easily.   Psychiatric/Behavioral:  Negative for behavioral problems and confusion.          Physical Examination:    Vitals:    04/10/24 0944   BP: 126/84   Temp: 97 °F (36.1 °C)       Physical Exam  Constitutional:       Appearance: Normal appearance. He is well-developed.   HENT:      Head: Normocephalic and atraumatic.   Eyes:

## 2024-04-16 ENCOUNTER — OFFICE VISIT (OUTPATIENT)
Dept: ORTHOPEDIC SURGERY | Age: 64
End: 2024-04-16

## 2024-04-16 VITALS
WEIGHT: 211 LBS | OXYGEN SATURATION: 98 % | BODY MASS INDEX: 30.21 KG/M2 | HEIGHT: 70 IN | HEART RATE: 74 BPM | TEMPERATURE: 97.7 F

## 2024-04-16 DIAGNOSIS — M25.511 TRIGGER POINT OF SHOULDER REGION, RIGHT: ICD-10-CM

## 2024-04-16 DIAGNOSIS — G89.29 CHRONIC NECK PAIN: ICD-10-CM

## 2024-04-16 DIAGNOSIS — M25.511 RIGHT SHOULDER PAIN, UNSPECIFIED CHRONICITY: Primary | ICD-10-CM

## 2024-04-16 DIAGNOSIS — M54.2 CHRONIC NECK PAIN: ICD-10-CM

## 2024-04-16 ASSESSMENT — ENCOUNTER SYMPTOMS
RESPIRATORY NEGATIVE: 1
ALLERGIC/IMMUNOLOGIC NEGATIVE: 1
EYES NEGATIVE: 1
GASTROINTESTINAL NEGATIVE: 1

## 2024-04-16 NOTE — PROGRESS NOTES
Orthopedic Surgery and Sports Medicine    Subjective:      Patient ID: Omid Pollard is a 63 y.o. male who presents today for:  Chief Complaint   Patient presents with    New Patient     Pt presents today for Right Shoulder Pain. Pain is a 8. The pain feels like a sharp pain. He has not iced nor applied heat to his shoulder. The pain does disturb his sleep, he says he can't sleep at night. The pain radiates over to his neck and all down the arm to the finger tips, it cause numbness and tingling.        Omid Pollard was sent to me by Hakeem Moscoso MD for evaluation of the patient's right shoulder pain.      SHERITA Wallace is a 63-year-old male who presents today for evaluation of his right shoulder, neck, and arm pain.  This has been present for 2 to 3 years.  He has seen multiple physicians for this issue including his cervical spine.  He has previously seen Dr. Moscoso for his neck and was referred to me to look at his right shoulder.  He locates the pain over the neck and posterior shoulder, mostly the scapula.  He does have radiation into the arm.  He reports it as a sharp pain.  He has trouble sleeping at night due to it.  He reports numbness and tingling in the right arm.  He does have a prior history of a stroke with left-sided weakness as well as previous open heart surgery in 2016.  He has had injections into his cervical spine in the past, but no injections for his shoulder.  He has not done any formal physical therapy.  He states that \"nobody will touch his neck\".    Previous treatment: Cervical spine injections  NSAIDs: No  Physical therapy: No    Hand Dominance: right  Occupation:   Workers Compensation:   Have you missed work for this issue? No  Is this issue being addressed under a worker's compensation claim? No    Past Medical History:   Diagnosis Date    Arthritis     bilateral shoulders and wrists    Bipolar 1 disorder (HCC)     CAD (coronary artery disease) 2016    MI. CABG BYPASS X3. 3 STENTS

## 2024-05-01 ENCOUNTER — INITIAL CONSULT (OUTPATIENT)
Dept: PAIN MANAGEMENT | Age: 64
End: 2024-05-01
Payer: MEDICARE

## 2024-05-01 VITALS
DIASTOLIC BLOOD PRESSURE: 70 MMHG | TEMPERATURE: 96.9 F | BODY MASS INDEX: 29.92 KG/M2 | WEIGHT: 209 LBS | SYSTOLIC BLOOD PRESSURE: 130 MMHG | HEIGHT: 70 IN

## 2024-05-01 DIAGNOSIS — M47.812 CERVICAL SPONDYLOSIS WITHOUT MYELOPATHY: Primary | ICD-10-CM

## 2024-05-01 PROCEDURE — G8417 CALC BMI ABV UP PARAM F/U: HCPCS | Performed by: PAIN MEDICINE

## 2024-05-01 PROCEDURE — 3017F COLORECTAL CA SCREEN DOC REV: CPT | Performed by: PAIN MEDICINE

## 2024-05-01 PROCEDURE — 3078F DIAST BP <80 MM HG: CPT | Performed by: PAIN MEDICINE

## 2024-05-01 PROCEDURE — 3075F SYST BP GE 130 - 139MM HG: CPT | Performed by: PAIN MEDICINE

## 2024-05-01 PROCEDURE — 99203 OFFICE O/P NEW LOW 30 MIN: CPT | Performed by: PAIN MEDICINE

## 2024-05-01 PROCEDURE — 1036F TOBACCO NON-USER: CPT | Performed by: PAIN MEDICINE

## 2024-05-01 PROCEDURE — G8427 DOCREV CUR MEDS BY ELIG CLIN: HCPCS | Performed by: PAIN MEDICINE

## 2024-05-01 ASSESSMENT — ENCOUNTER SYMPTOMS
EYES NEGATIVE: 1
ALLERGIC/IMMUNOLOGIC NEGATIVE: 1
GASTROINTESTINAL NEGATIVE: 1
RESPIRATORY NEGATIVE: 1

## 2024-05-01 NOTE — PROGRESS NOTES
History of Present Illness     Patient Identification  Omid Pollard is a 63 y.o. male.    Patient information was obtained from patient.      Chief Complaint   Chief Complaint   Patient presents with    Consultation    Shoulder Pain     right    Arm Pain     right    Neck Pain       Patient presents with complaint of Neck pain. 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 Onset of pain was 6 years ago and has been gradually worsening since 2 years. The pain is located in neck more Right side, described as sharp and rated as severe, Rates at 8/10. with radiation Rt elbow mostly but also goes to his fingers. Symptoms include Left hemiplegia, weakness Rt side past 6 months. The patient also Denied complains of fever, dysuria, weight loss, history of cancer, history of osteoporosis, history of steroid use. The patient denies numbness, incontinence. The patient denies other injuries. Care prior to arrival consisted of Pain injections with no relief. No records available.    Past Medical History:   Diagnosis Date    Arthritis     bilateral shoulders and wrists    Bipolar 1 disorder ([facility])     CAD (coronary artery disease) 2016    MI. CABG BYPASS X3. 3 STENTS PLACED    CAD (coronary artery disease) 2017    3 STENTS PLACED/ Dr. Rivera/ jhoana    Cerebrovascular disease     Chronic back pain     COPD (chronic obstructive pulmonary disease) ([facility])     CVA (cerebral vascular accident) ([facility]) 05/2016    LEFT SIDED WEAKNESS.    GERD (gastroesophageal reflux disease)     Groin pain, right     History of blood transfusion     no reaction    Hyperlipidemia     PCP Dr. Mei/ daniel    Hypertension     Insomnia     Left-sided weakness     MI (myocardial infarction) ([facility]) 2016    Neuropathy     BILATERAL LEGS    PAD (peripheral artery disease) ([facility])     Peripheral vascular disease ([facility])     Right inguinal hernia     Use of cane as ambulatory aid     Uses walker     Wears dentures     FULL UPPERS AND LOWERS    Wears glasses      Family

## 2024-05-03 ENCOUNTER — TELEPHONE (OUTPATIENT)
Dept: PAIN MANAGEMENT | Age: 64
End: 2024-05-03

## 2024-05-03 NOTE — TELEPHONE ENCOUNTER
Patient called asking if we could contact his cardiologist Dr. Field regarding stopping his Xarelto for the injections Dr. Guadarrama wants to do. [phone]

## 2024-05-06 NOTE — PROGRESS NOTES
Patient represents low risk of cardiac complications from general anesthesia and I see no cardiac contraindication to proceeding with clinically indicated surgery.  Routine monitoring is appropriate.  No objection to holding his aspirin 7 days prior to procedures.  No objection to holding his Xarelto 4 days prior to procedures.  Would resume both when procedure is done.  KIERAN

## 2024-05-08 ENCOUNTER — TELEPHONE (OUTPATIENT)
Dept: PAIN MANAGEMENT | Age: 64
End: 2024-05-08

## 2024-05-08 NOTE — TELEPHONE ENCOUNTER
REFERRAL # [id number]    RIGHT C345 MBB     AUTH FROM 5/16/24-5/31/24    OK to schedule procedure approved as above.   Please note sides/levels approved and date range.   (If applicable, sides/levels approved may differ from those ordered)    TO BE SCHEDULED WITH DR CHRISTIANSON

## 2024-05-23 ENCOUNTER — PROCEDURE VISIT (OUTPATIENT)
Dept: PAIN MANAGEMENT | Age: 64
End: 2024-05-23
Payer: MEDICARE

## 2024-05-23 DIAGNOSIS — M47.812 CERVICAL SPONDYLOSIS WITHOUT MYELOPATHY: Primary | ICD-10-CM

## 2024-05-23 PROCEDURE — 64490 INJ PARAVERT F JNT C/T 1 LEV: CPT | Performed by: PAIN MEDICINE

## 2024-05-23 PROCEDURE — 64491 INJ PARAVERT F JNT C/T 2 LEV: CPT | Performed by: PAIN MEDICINE

## 2024-05-23 RX ORDER — BUPIVACAINE HYDROCHLORIDE 2.5 MG/ML
30 INJECTION, SOLUTION EPIDURAL; INFILTRATION; INTRACAUDAL ONCE
Status: COMPLETED | OUTPATIENT
Start: 2024-05-23 | End: 2024-05-23

## 2024-05-23 RX ADMIN — BUPIVACAINE HYDROCHLORIDE 75 MG: 2.5 INJECTION, SOLUTION EPIDURAL; INFILTRATION; INTRACAUDAL at 17:17

## 2024-05-23 ASSESSMENT — ENCOUNTER SYMPTOMS
EYES NEGATIVE: 1
GASTROINTESTINAL NEGATIVE: 1
RESPIRATORY NEGATIVE: 1
ALLERGIC/IMMUNOLOGIC NEGATIVE: 1

## 2024-05-23 NOTE — PROGRESS NOTES
History of Present Illness     Patient Identification  Omid Pollard is a 63 y.o. male.    Patient information was obtained from patient.      Chief Complaint   No chief complaint on file.      Patient presents For facet blocks with complaint of Neck pain. 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 Onset of pain was 6 years ago and has been gradually worsening since 2 years. The pain is located in neck more Right side, described as sharp and rated as severe, Rates at 8/10. with radiation Rt elbow mostly but also goes to his fingers. Symptoms include Left hemiplegia, weakness Rt side past 6 months. The patient also Denied complains of fever, dysuria, weight loss, history of cancer, history of osteoporosis, history of steroid use. The patient denies numbness, incontinence. The patient denies other injuries. Care prior to arrival consisted of Pain injections with no relief. No records available.    Past Medical History:   Diagnosis Date    Arthritis     bilateral shoulders and wrists    Bipolar 1 disorder ([facility])     CAD (coronary artery disease) 2016    MI. CABG BYPASS X3. 3 STENTS PLACED    CAD (coronary artery disease) 2017    3 STENTS PLACED/ Dr. Rivera/ jhoana    Cerebrovascular disease     Chronic back pain     COPD (chronic obstructive pulmonary disease) ([facility])     CVA (cerebral vascular accident) ([facility]) 05/2016    LEFT SIDED WEAKNESS.    GERD (gastroesophageal reflux disease)     Groin pain, right     History of blood transfusion     no reaction    Hyperlipidemia     PCP Dr. Mei/ daniel    Hypertension     Insomnia     Left-sided weakness     MI (myocardial infarction) ([facility]) 2016    Neuropathy     BILATERAL LEGS    PAD (peripheral artery disease) ([facility])     Peripheral vascular disease ([facility])     Right inguinal hernia     Use of cane as ambulatory aid     Uses walker     Wears dentures     FULL UPPERS AND LOWERS    Wears glasses      Family History   Problem Relation Age of Onset    Kidney Disease Mother

## 2024-05-31 ENCOUNTER — OFFICE VISIT (OUTPATIENT)
Dept: PAIN MANAGEMENT | Age: 64
End: 2024-05-31
Payer: MEDICARE

## 2024-05-31 VITALS
WEIGHT: 209 LBS | HEIGHT: 70 IN | BODY MASS INDEX: 29.92 KG/M2 | DIASTOLIC BLOOD PRESSURE: 72 MMHG | SYSTOLIC BLOOD PRESSURE: 148 MMHG

## 2024-05-31 DIAGNOSIS — M47.812 CERVICAL SPONDYLOSIS WITHOUT MYELOPATHY: Primary | ICD-10-CM

## 2024-05-31 PROCEDURE — 3017F COLORECTAL CA SCREEN DOC REV: CPT | Performed by: PAIN MEDICINE

## 2024-05-31 PROCEDURE — G8427 DOCREV CUR MEDS BY ELIG CLIN: HCPCS | Performed by: PAIN MEDICINE

## 2024-05-31 PROCEDURE — G8417 CALC BMI ABV UP PARAM F/U: HCPCS | Performed by: PAIN MEDICINE

## 2024-05-31 PROCEDURE — 3078F DIAST BP <80 MM HG: CPT | Performed by: PAIN MEDICINE

## 2024-05-31 PROCEDURE — 3077F SYST BP >= 140 MM HG: CPT | Performed by: PAIN MEDICINE

## 2024-05-31 PROCEDURE — 99213 OFFICE O/P EST LOW 20 MIN: CPT | Performed by: PAIN MEDICINE

## 2024-05-31 PROCEDURE — 1036F TOBACCO NON-USER: CPT | Performed by: PAIN MEDICINE

## 2024-05-31 RX ORDER — AMANTADINE HYDROCHLORIDE 100 MG/1
100 CAPSULE, GELATIN COATED ORAL
COMMUNITY
Start: 2024-04-10

## 2024-05-31 ASSESSMENT — ENCOUNTER SYMPTOMS
GASTROINTESTINAL NEGATIVE: 1
ALLERGIC/IMMUNOLOGIC NEGATIVE: 1
EYES NEGATIVE: 1
RESPIRATORY NEGATIVE: 1

## 2024-05-31 NOTE — PROGRESS NOTES
History of Present Illness     Patient Identification  Omid Pollard is a 63 y.o. male.    Patient information was obtained from patient.      Chief Complaint   Chief Complaint   Patient presents with    Neck Pain    Shoulder Pain     Right        Patient presents For follow up facet blocks with complaint of Neck pain admits 70% relief for few hours. 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 Onset of pain was 6 years ago and has been gradually worsening since 2 years. The pain is located in neck more Right side, described as sharp and rated as severe, Rates at 8/10. with radiation Rt elbow mostly but also goes to his fingers. Symptoms include Left hemiplegia, weakness Rt side past 6 months. The patient also Denied complains of fever, dysuria, weight loss, history of cancer, history of osteoporosis, history of steroid use. The patient denies numbness, incontinence. The patient denies other injuries. Care prior to arrival consisted of Pain injections with no relief. No records available.    Past Medical History:   Diagnosis Date    Arthritis     bilateral shoulders and wrists    Bipolar 1 disorder ([facility])     CAD (coronary artery disease) 2016    MI. CABG BYPASS X3. 3 STENTS PLACED    CAD (coronary artery disease) 2017    3 STENTS PLACED/ Dr. Rivera/ jhoana    Cerebrovascular disease     Chronic back pain     COPD (chronic obstructive pulmonary disease) ([facility])     CVA (cerebral vascular accident) ([facility]) 05/2016    LEFT SIDED WEAKNESS.    GERD (gastroesophageal reflux disease)     Groin pain, right     History of blood transfusion     no reaction    Hyperlipidemia     PCP Dr. Mei/ daniel    Hypertension     Insomnia     Left-sided weakness     MI (myocardial infarction) ([facility]) 2016    Neuropathy     BILATERAL LEGS    PAD (peripheral artery disease) ([facility])     Peripheral vascular disease ([facility])     Right inguinal hernia     Use of cane as ambulatory aid     Uses walker     Wears dentures     FULL UPPERS AND LOWERS    Wears

## 2024-11-15 ENCOUNTER — HOSPITAL ENCOUNTER
Dept: HOSPITAL 101 - EC | Age: 64
Discharge: HOME | End: 2024-11-15
Payer: MEDICARE

## 2024-11-15 DIAGNOSIS — M51.369: ICD-10-CM

## 2024-11-15 DIAGNOSIS — M54.50: Primary | ICD-10-CM

## 2024-11-15 PROCEDURE — 72110 X-RAY EXAM L-2 SPINE 4/>VWS: CPT

## 2024-12-20 ENCOUNTER — HOSPITAL ENCOUNTER
Dept: HOSPITAL 101 - EC | Age: 64
Discharge: HOME | End: 2024-12-20
Payer: MEDICARE

## 2024-12-20 DIAGNOSIS — M54.2: Primary | ICD-10-CM

## 2024-12-20 PROCEDURE — 72052 X-RAY EXAM NECK SPINE 6/>VWS: CPT

## (undated) DEVICE — NEEDLE INSUF L120MM DIA2MM DISP FOR PNEUMOPERI ENDOPATH

## (undated) DEVICE — GARMENT,MEDLINE,DVT,INT,CALF,MED, GEN2: Brand: MEDLINE

## (undated) DEVICE — BLADELESS OBTURATOR: Brand: WECK VISTA

## (undated) DEVICE — PROTECTOR ULN NRV PUR FOAM HK LOOP STRP ANATOMICALLY

## (undated) DEVICE — DISPOSABLE LAPAROSCOPIC CORDS, 1 PER POUCH: Brand: A&E MEDICAL / DISPOSABLE LAPAROSCOPIC CORDS

## (undated) DEVICE — ADHESIVE SKIN CLSR 0.7ML TOP DERMBND ADV

## (undated) DEVICE — CHLORAPREP 26ML ORANGE

## (undated) DEVICE — TOTAL TRAY, 16FR 10ML SIL FOLEY, URN: Brand: MEDLINE

## (undated) DEVICE — SUTURE DEV SZ 2-0 WND CLSR ABSRB GS-22 VLOC COVIDIEN VLOCM2145

## (undated) DEVICE — ARM DRAPE

## (undated) DEVICE — SHIELD SOAK PRE-KLENZ 6ML

## (undated) DEVICE — SOLUTION IV 1000ML 0.9% SOD CHL PH 5 INJ USP VIAFLX PLAS

## (undated) DEVICE — GLOVE ORANGE PI 8 1/2   MSG9085

## (undated) DEVICE — SYRINGE, LUER LOCK, 30ML: Brand: MEDLINE

## (undated) DEVICE — TRI-LUMEN FILTERED TUBE SET WITH ACTIVATED CHARCOAL FILTER: Brand: AIRSEAL

## (undated) DEVICE — TIP COVER ACCESSORY

## (undated) DEVICE — SOLUTION ANTIFOG VIS SYS CLEARIFY LAPSCP

## (undated) DEVICE — STERILE POLYISOPRENE POWDER-FREE SURGICAL GLOVES WITH EMOLLIENT COATING: Brand: PROTEXIS

## (undated) DEVICE — 35 ML SYRINGE LUER-LOCK TIP: Brand: MONOJECT

## (undated) DEVICE — CANNULA SEAL

## (undated) DEVICE — SCISSOR SURG METZ CRV TIP

## (undated) DEVICE — TROCAR: Brand: KII FIOS FIRST ENTRY

## (undated) DEVICE — Device

## (undated) DEVICE — AIRSEAL 8 MM ACCESS PORT AND LOW PROFILE OBTURATOR WITH BLADELESS OPTICAL TIP, 120 MM LENGTH: Brand: AIRSEAL

## (undated) DEVICE — SUTURE MCRYL + SZ 4 0 L18IN ABSRB UD PC 3 L16MM 3 8 CIR PRIM MCP845G

## (undated) DEVICE — GLOVE SURG SZ 75 L12IN FNGR THK87MIL WHT LTX FREE

## (undated) DEVICE — DEVICE TRCR 12X9X3IN WHT CLSR DISP OMNICLOSE

## (undated) DEVICE — GLOVE EXAM M L95IN FNGR THK35MIL PALM THK24MIL OFF WHT

## (undated) DEVICE — NEEDLE SPNL 18GA L3.5IN W/ QNCKE SHARPER BVL DURA CLICK